# Patient Record
Sex: MALE | Race: WHITE | NOT HISPANIC OR LATINO | Employment: UNEMPLOYED | URBAN - METROPOLITAN AREA
[De-identification: names, ages, dates, MRNs, and addresses within clinical notes are randomized per-mention and may not be internally consistent; named-entity substitution may affect disease eponyms.]

---

## 2024-02-29 ENCOUNTER — OCCUPATIONAL HEALTH (OUTPATIENT)
Dept: URGENT CARE | Facility: CLINIC | Age: 29
End: 2024-02-29

## 2024-02-29 PROCEDURE — 80305 DRUG TEST PRSMV DIR OPT OBS: CPT | Mod: S$GLB,,, | Performed by: EMERGENCY MEDICINE

## 2024-07-22 DIAGNOSIS — S83.106A: Primary | ICD-10-CM

## 2024-07-26 ENCOUNTER — TELEPHONE (OUTPATIENT)
Dept: ORTHOPEDICS | Facility: CLINIC | Age: 29
End: 2024-07-26

## 2024-08-01 DIAGNOSIS — M25.561 RIGHT KNEE PAIN, UNSPECIFIED CHRONICITY: Primary | ICD-10-CM

## 2024-08-06 ENCOUNTER — OFFICE VISIT (OUTPATIENT)
Dept: ORTHOPEDICS | Facility: CLINIC | Age: 29
End: 2024-08-06
Payer: OTHER MISCELLANEOUS

## 2024-08-06 ENCOUNTER — HOSPITAL ENCOUNTER (OUTPATIENT)
Dept: RADIOLOGY | Facility: HOSPITAL | Age: 29
Discharge: HOME | End: 2024-08-06
Attending: ORTHOPAEDIC SURGERY
Payer: OTHER MISCELLANEOUS

## 2024-08-06 VITALS — HEIGHT: 72 IN | BODY MASS INDEX: 27.09 KG/M2 | WEIGHT: 200 LBS

## 2024-08-06 DIAGNOSIS — S83.411A COMPLETE TEAR OF MCL OF KNEE, RIGHT, INITIAL ENCOUNTER: ICD-10-CM

## 2024-08-06 DIAGNOSIS — S83.511A RUPTURE OF ANTERIOR CRUCIATE LIGAMENT OF RIGHT KNEE, INITIAL ENCOUNTER: Primary | ICD-10-CM

## 2024-08-06 DIAGNOSIS — M25.561 RIGHT KNEE PAIN, UNSPECIFIED CHRONICITY: ICD-10-CM

## 2024-08-06 PROCEDURE — 73564 X-RAY EXAM KNEE 4 OR MORE: CPT | Mod: 26,RT,, | Performed by: ORTHOPAEDIC SURGERY

## 2024-08-06 PROCEDURE — 99999 PR PBB SHADOW E&M-EST. PATIENT-LVL III: CPT | Mod: PBBFAC,,, | Performed by: ORTHOPAEDIC SURGERY

## 2024-08-06 PROCEDURE — 73564 X-RAY EXAM KNEE 4 OR MORE: CPT | Mod: TC,RT

## 2024-08-06 PROCEDURE — 99213 OFFICE O/P EST LOW 20 MIN: CPT | Mod: PBBFAC,25 | Performed by: ORTHOPAEDIC SURGERY

## 2024-08-06 PROCEDURE — 99204 OFFICE O/P NEW MOD 45 MIN: CPT | Mod: S$PBB,,, | Performed by: ORTHOPAEDIC SURGERY

## 2024-08-06 NOTE — H&P (VIEW-ONLY)
ASSESSMENT:      ICD-10-CM ICD-9-CM   1. Rupture of anterior cruciate ligament of right knee, initial encounter  S83.511A 844.2   2. Complete tear of MCL of knee, right, initial encounter  S83.411A 844.1       PLAN:     Findings and treatment options were discussed with the patient regarding the diagnosis.   All questions were answered regarding Imer Cagle 's painful knee.      Natural history and expected course discussed. Questions answered. Educational materials distributed. Rest, ice, compression, and elevation (RICE) therapy.  Will plan for ACL reconstruction    The spectrum of treatment options were discussed with the patient, including nonoperative and operative options.  We have discussed the surgery and recovery of arthroscopic knee surgery. he understands that there may be limited weightbearing up to several weeks after surgery depending on procedures that are performed at the time of surgery.    After thorough discussion, the patient has elected to undergo surgical treatment to include:  right   A. Knee arthroscopic Anterior Cruciate Ligament reconstruction with quad tendon autograft    B MCL repair versus reconstruction right knee with allograft    Informed consent for the procedure was obtained . The details of the procedure along with the expected postop rehab and recovery course were reviewed. Alternatives both operative and non-operative with associated risks and benefits discussed. The outlined risks and potential complications of the proposed procedure include but are not limited to: infection, poor wound healing, scarring, stiffness, swelling, continued or recurrent pain, instability, hardware or prosthetic failure, symptomatic hardware requiring removal, weakness, neurovascular injury, numbness, chronic regional pain disorder, tissue nonhealing/irreparability/retear, contralateral ligament injury, chondral injury, arthritis, fracture, blood clot formation, inability to return to  previous level of activity, anesthetic or regional block complication up to death, need for additional procedure as indicated, and potential need for further surgery.     he was also informed and understands the risks of surgery are greater for patients with a current condition or history of heart disease, obesity, clotting disorders, recurrent infections, steroid use, current or past smoking, and factors such as sedentary lifestyle and noncompliance with medications, therapy or follow-up. The degree of the increased risk is hard to estimate with any degree of precision.    All questions were answered. The patient has verbalized understanding of these issues and wishes to proceed as discussed.     There are no Patient Instructions on file for this visit.    IMAGING:   X-Ray Knee Complete 4 Or More Views Right    Result Date: 8/6/2024  See Procedure Notes for results. IMPRESSION: Please see Ortho procedure notes for report.  This procedure was auto-finalized by: Virtual Radiologist   Four views right knee were obtained today.  There is a bony avulsion from the medial proximal attachment of the MCL.  Mediolateral compartments appear to be well-preserved.      MRI was obtained from an outside center demonstrating the presence of a full-thickness ACL tear and a proximal MCL avulsion of the right knee      CC: Knee pain    29 y.o. Male who presents as a new patient to me for evaluation of  right knee pain.  States  he was changing a tire at work when it rolled on his knee.  This is a worker's comp case.  He describes this injury as a dislocation type event in his knee had to be popped back into place  Occupation:   Pain has been present for 3 weeks.  Injury description tire rolled on knee.   Mechanical symptoms, such as clicking, locking, and popping are present.    Swelling and effusions  are present.   The patient does  describe symptoms of knee instability, such as the knee buckling and the knee giving  way.   Symptoms are worsened with activity.  Better with rest. Treatment thus far has included rest, activity modifications, and oral medications.    he  has not had formal physical therapy.  he has not had prior injections injections into the knee.   he has had previous advanced imaging such as MRI.     Here today to discuss diagnosis and treatment options.          REVIEW OF SYSTEMS:   Constitution: Negative. Negative for chills, fever and night sweats.    Hematologic/Lymphatic: Negative for bleeding problem. Does not bruise/bleed easily.   Skin: Negative for dry skin, itching and rash.   Musculoskeletal: Negative for falls. Positive for knee pain and muscle weakness.     All other review of symptoms were reviewed and found to be noncontributory.     PAST MEDICAL HISTORY:   History reviewed. No pertinent past medical history.    PAST SURGICAL HISTORY:   Past Surgical History:   Procedure Laterality Date    HAND SURGERY         FAMILY HISTORY:   Family History   Problem Relation Name Age of Onset    Cancer Father      Diabetes Father      Hypertension Father      Heart disease Father         SOCIAL HISTORY:   Social History     Socioeconomic History    Marital status:    Tobacco Use    Smoking status: Every Day     Current packs/day: 1.00     Types: Cigarettes   Substance and Sexual Activity    Alcohol use: No    Drug use: No       MEDICATIONS:   No current outpatient medications on file.    ALLERGIES:   Review of patient's allergies indicates:  No Known Allergies     PHYSICAL EXAMINATION:    General    Nursing note and vitals reviewed.  HENT:   Nose: Nose normal.   Pulmonary/Chest: No respiratory distress. He exhibits no tenderness.   Abdominal: Soft. He exhibits no distension. There is no abdominal tenderness.   Psychiatric: Thought content normal.           Right Knee Exam     Tenderness   The patient is tender to palpation of the lateral joint line.    Range of Motion   Extension:  abnormal   Flexion:   abnormal     Tests   Meniscus   Travis:  Medial - positive   Ligament Examination   Lachman: abnormal   MCL - Valgus: normal (0 to 2mm)  Pivot Shift: abnormal    Muscle Strength   Right Lower Extremity   Quadriceps:  4/5   Hamstrin/5   Increased laxity to valgus stress.  Consistent with a grade 3 MCL rupture      No orders of the defined types were placed in this encounter.      Procedures

## 2024-08-07 DIAGNOSIS — S83.411A COMPLETE TEAR OF MCL OF KNEE, RIGHT, INITIAL ENCOUNTER: ICD-10-CM

## 2024-08-07 DIAGNOSIS — S83.511A RUPTURE OF ANTERIOR CRUCIATE LIGAMENT OF RIGHT KNEE: ICD-10-CM

## 2024-08-07 DIAGNOSIS — S83.511A RUPTURE OF ANTERIOR CRUCIATE LIGAMENT OF RIGHT KNEE, INITIAL ENCOUNTER: Primary | ICD-10-CM

## 2024-08-07 RX ORDER — MUPIROCIN 20 MG/G
OINTMENT TOPICAL
OUTPATIENT
Start: 2024-08-07

## 2024-08-07 RX ORDER — CEFAZOLIN SODIUM 2 G/50ML
2 SOLUTION INTRAVENOUS
OUTPATIENT
Start: 2024-08-07

## 2024-08-07 RX ORDER — SODIUM CHLORIDE 9 MG/ML
INJECTION, SOLUTION INTRAVENOUS CONTINUOUS
OUTPATIENT
Start: 2024-08-07

## 2024-08-08 RX ORDER — NAPROXEN 500 MG/1
500 TABLET ORAL 2 TIMES DAILY WITH MEALS
Qty: 60 TABLET | Refills: 3 | Status: SHIPPED | OUTPATIENT
Start: 2024-08-08

## 2024-08-09 ENCOUNTER — TELEPHONE (OUTPATIENT)
Dept: ORTHOPEDICS | Facility: CLINIC | Age: 29
End: 2024-08-09
Payer: OTHER MISCELLANEOUS

## 2024-08-14 ENCOUNTER — HOSPITAL ENCOUNTER (OUTPATIENT)
Facility: HOSPITAL | Age: 29
Discharge: HOME | End: 2024-08-14
Attending: ORTHOPAEDIC SURGERY
Payer: OTHER MISCELLANEOUS

## 2024-08-14 ENCOUNTER — ANESTHESIA (OUTPATIENT)
Dept: SURGERY | Facility: HOSPITAL | Age: 29
End: 2024-08-14
Payer: OTHER MISCELLANEOUS

## 2024-08-14 ENCOUNTER — ANESTHESIA EVENT (OUTPATIENT)
Dept: SURGERY | Facility: HOSPITAL | Age: 29
End: 2024-08-14
Payer: OTHER MISCELLANEOUS

## 2024-08-14 VITALS
WEIGHT: 200 LBS | BODY MASS INDEX: 27.09 KG/M2 | TEMPERATURE: 99 F | DIASTOLIC BLOOD PRESSURE: 87 MMHG | HEART RATE: 90 BPM | SYSTOLIC BLOOD PRESSURE: 122 MMHG | HEIGHT: 72 IN | OXYGEN SATURATION: 98 % | RESPIRATION RATE: 18 BRPM

## 2024-08-14 DIAGNOSIS — S83.511A RUPTURE OF ANTERIOR CRUCIATE LIGAMENT OF RIGHT KNEE, INITIAL ENCOUNTER: ICD-10-CM

## 2024-08-14 DIAGNOSIS — S83.511A RUPTURE OF ANTERIOR CRUCIATE LIGAMENT OF RIGHT KNEE: ICD-10-CM

## 2024-08-14 DIAGNOSIS — S83.411A COMPLETE TEAR OF MCL OF KNEE, RIGHT, INITIAL ENCOUNTER: Primary | ICD-10-CM

## 2024-08-14 PROCEDURE — 63600175 PHARM REV CODE 636 W HCPCS: Performed by: NURSE ANESTHETIST, CERTIFIED REGISTERED

## 2024-08-14 PROCEDURE — 25000003 PHARM REV CODE 250: Performed by: ORTHOPAEDIC SURGERY

## 2024-08-14 PROCEDURE — 27000655: Performed by: ANESTHESIOLOGY

## 2024-08-14 PROCEDURE — 37000009 HC ANESTHESIA EA ADD 15 MINS: Performed by: ORTHOPAEDIC SURGERY

## 2024-08-14 PROCEDURE — 63600175 PHARM REV CODE 636 W HCPCS: Performed by: ORTHOPAEDIC SURGERY

## 2024-08-14 PROCEDURE — 63600175 PHARM REV CODE 636 W HCPCS: Performed by: ANESTHESIOLOGY

## 2024-08-14 PROCEDURE — 71000016 HC POSTOP RECOV ADDL HR: Performed by: ORTHOPAEDIC SURGERY

## 2024-08-14 PROCEDURE — C1769 GUIDE WIRE: HCPCS | Performed by: ORTHOPAEDIC SURGERY

## 2024-08-14 PROCEDURE — 29888 ARTHRS AID ACL RPR/AGMNTJ: CPT | Mod: 59,RT,, | Performed by: ORTHOPAEDIC SURGERY

## 2024-08-14 PROCEDURE — 36000711: Performed by: ORTHOPAEDIC SURGERY

## 2024-08-14 PROCEDURE — 27200750 HC INSULATED NEEDLE/ STIMUPLEX: Performed by: ANESTHESIOLOGY

## 2024-08-14 PROCEDURE — 71000015 HC POSTOP RECOV 1ST HR: Performed by: ORTHOPAEDIC SURGERY

## 2024-08-14 PROCEDURE — 37000008 HC ANESTHESIA 1ST 15 MINUTES: Performed by: ORTHOPAEDIC SURGERY

## 2024-08-14 PROCEDURE — 25000003 PHARM REV CODE 250: Performed by: NURSE ANESTHETIST, CERTIFIED REGISTERED

## 2024-08-14 PROCEDURE — C1762 CONN TISS, HUMAN(INC FASCIA): HCPCS | Performed by: ORTHOPAEDIC SURGERY

## 2024-08-14 PROCEDURE — 36000710: Performed by: ORTHOPAEDIC SURGERY

## 2024-08-14 PROCEDURE — 27000284 HC CANNULA NASAL: Performed by: ANESTHESIOLOGY

## 2024-08-14 PROCEDURE — 27000177 HC AIRWAY, LARYNGEAL MASK: Performed by: ANESTHESIOLOGY

## 2024-08-14 PROCEDURE — C1713 ANCHOR/SCREW BN/BN,TIS/BN: HCPCS | Performed by: ORTHOPAEDIC SURGERY

## 2024-08-14 PROCEDURE — 27201423 OPTIME MED/SURG SUP & DEVICES STERILE SUPPLY: Performed by: ORTHOPAEDIC SURGERY

## 2024-08-14 PROCEDURE — 27000716 HC OXISENSOR PROBE, ANY SIZE: Performed by: ANESTHESIOLOGY

## 2024-08-14 PROCEDURE — 71000033 HC RECOVERY, INTIAL HOUR: Performed by: ORTHOPAEDIC SURGERY

## 2024-08-14 PROCEDURE — 27427 RECONSTRUCTION KNEE: CPT | Mod: 51,RT,, | Performed by: ORTHOPAEDIC SURGERY

## 2024-08-14 PROCEDURE — 97161 PT EVAL LOW COMPLEX 20 MIN: CPT

## 2024-08-14 PROCEDURE — 27405 REPAIR OF KNEE LIGAMENT: CPT | Mod: 51,RT,, | Performed by: ORTHOPAEDIC SURGERY

## 2024-08-14 PROCEDURE — 27000510 HC BLANKET BAIR HUGGER ANY SIZE: Performed by: ANESTHESIOLOGY

## 2024-08-14 DEVICE — SET,FOR GRAFT PASS WIRE
Type: IMPLANTABLE DEVICE | Site: KNEE | Status: FUNCTIONAL
Brand: ARTHREX®

## 2024-08-14 DEVICE — LNT IMPLANT SYSTEM, 3.9 BC SWIVELOCK
Type: IMPLANTABLE DEVICE | Site: KNEE | Status: FUNCTIONAL
Brand: ARTHREX®

## 2024-08-14 DEVICE — BIO-COMP SWVLK C, CLD 4.75X19.1MM
Type: IMPLANTABLE DEVICE | Site: KNEE | Status: FUNCTIONAL
Brand: ARTHREX®

## 2024-08-14 DEVICE — IMPLANTABLE DEVICE
Type: IMPLANTABLE DEVICE | Site: KNEE | Status: FUNCTIONAL
Brand: JRF VERSAGRAFT

## 2024-08-14 DEVICE — 3.0MM KNOTLESS SUTURETAK
Type: IMPLANTABLE DEVICE | Site: KNEE | Status: FUNCTIONAL
Brand: ARTHREX®

## 2024-08-14 DEVICE — IMPL SYS, AUTOGRAFT GRAFTLINK CP 2
Type: IMPLANTABLE DEVICE | Site: KNEE | Status: FUNCTIONAL
Brand: ARTHREX®

## 2024-08-14 DEVICE — FIBERTAG TIGHTROPE II ABS
Type: IMPLANTABLE DEVICE | Site: KNEE | Status: FUNCTIONAL
Brand: ARTHREX®

## 2024-08-14 DEVICE — IMPLSYS 2NDRY FIXATN BIOSWVLK 4.75X19.1
Type: IMPLANTABLE DEVICE | Site: KNEE | Status: FUNCTIONAL
Brand: ARTHREX®

## 2024-08-14 RX ORDER — KETAMINE HYDROCHLORIDE 10 MG/ML
INJECTION, SOLUTION INTRAMUSCULAR; INTRAVENOUS
Status: DISCONTINUED | OUTPATIENT
Start: 2024-08-14 | End: 2024-08-14

## 2024-08-14 RX ORDER — OXYCODONE AND ACETAMINOPHEN 5; 325 MG/1; MG/1
1 TABLET ORAL EVERY 4 HOURS PRN
Status: DISCONTINUED | OUTPATIENT
Start: 2024-08-14 | End: 2024-08-14 | Stop reason: HOSPADM

## 2024-08-14 RX ORDER — FENTANYL CITRATE 50 UG/ML
INJECTION, SOLUTION INTRAMUSCULAR; INTRAVENOUS
Status: DISCONTINUED | OUTPATIENT
Start: 2024-08-14 | End: 2024-08-14

## 2024-08-14 RX ORDER — ONDANSETRON 4 MG/1
8 TABLET, ORALLY DISINTEGRATING ORAL EVERY 8 HOURS PRN
Status: DISCONTINUED | OUTPATIENT
Start: 2024-08-14 | End: 2024-08-14 | Stop reason: HOSPADM

## 2024-08-14 RX ORDER — OXYCODONE HYDROCHLORIDE 5 MG/1
10 TABLET ORAL EVERY 4 HOURS PRN
Status: DISCONTINUED | OUTPATIENT
Start: 2024-08-14 | End: 2024-08-14 | Stop reason: HOSPADM

## 2024-08-14 RX ORDER — OXYCODONE AND ACETAMINOPHEN 10; 325 MG/1; MG/1
1 TABLET ORAL EVERY 6 HOURS PRN
Qty: 30 TABLET | Refills: 0 | Status: SHIPPED | OUTPATIENT
Start: 2024-08-14

## 2024-08-14 RX ORDER — ACETAMINOPHEN 10 MG/ML
INJECTION, SOLUTION INTRAVENOUS
Status: DISCONTINUED | OUTPATIENT
Start: 2024-08-14 | End: 2024-08-14

## 2024-08-14 RX ORDER — MIDAZOLAM HYDROCHLORIDE 1 MG/ML
INJECTION INTRAMUSCULAR; INTRAVENOUS
Status: DISCONTINUED | OUTPATIENT
Start: 2024-08-14 | End: 2024-08-14

## 2024-08-14 RX ORDER — SULFAMETHOXAZOLE AND TRIMETHOPRIM 800; 160 MG/1; MG/1
1 TABLET ORAL 2 TIMES DAILY
Qty: 6 TABLET | Refills: 0 | Status: SHIPPED | OUTPATIENT
Start: 2024-08-14 | End: 2024-08-17

## 2024-08-14 RX ORDER — DIPHENHYDRAMINE HYDROCHLORIDE 50 MG/ML
25 INJECTION INTRAMUSCULAR; INTRAVENOUS EVERY 6 HOURS PRN
Status: DISCONTINUED | OUTPATIENT
Start: 2024-08-14 | End: 2024-08-14 | Stop reason: HOSPADM

## 2024-08-14 RX ORDER — PHENYLEPHRINE HYDROCHLORIDE 10 MG/ML
INJECTION INTRAVENOUS
Status: DISCONTINUED | OUTPATIENT
Start: 2024-08-14 | End: 2024-08-14

## 2024-08-14 RX ORDER — ASPIRIN 81 MG/1
81 TABLET ORAL DAILY
Qty: 30 TABLET | Refills: 0 | Status: SHIPPED | OUTPATIENT
Start: 2024-08-14 | End: 2025-08-14

## 2024-08-14 RX ORDER — MEPERIDINE HYDROCHLORIDE 25 MG/ML
25 INJECTION INTRAMUSCULAR; INTRAVENOUS; SUBCUTANEOUS EVERY 10 MIN PRN
Status: DISCONTINUED | OUTPATIENT
Start: 2024-08-14 | End: 2024-08-14 | Stop reason: HOSPADM

## 2024-08-14 RX ORDER — ROPIVACAINE HYDROCHLORIDE 7.5 MG/ML
INJECTION, SOLUTION EPIDURAL; PERINEURAL
Status: COMPLETED | OUTPATIENT
Start: 2024-08-14 | End: 2024-08-14

## 2024-08-14 RX ORDER — LIDOCAINE HYDROCHLORIDE 20 MG/ML
INJECTION, SOLUTION EPIDURAL; INFILTRATION; INTRACAUDAL; PERINEURAL
Status: DISCONTINUED | OUTPATIENT
Start: 2024-08-14 | End: 2024-08-14

## 2024-08-14 RX ORDER — ONDANSETRON 4 MG/1
4 TABLET, ORALLY DISINTEGRATING ORAL EVERY 6 HOURS PRN
Qty: 30 TABLET | Refills: 0 | Status: SHIPPED | OUTPATIENT
Start: 2024-08-14

## 2024-08-14 RX ORDER — HYDROMORPHONE HYDROCHLORIDE 2 MG/ML
INJECTION, SOLUTION INTRAMUSCULAR; INTRAVENOUS; SUBCUTANEOUS
Status: DISCONTINUED | OUTPATIENT
Start: 2024-08-14 | End: 2024-08-14

## 2024-08-14 RX ORDER — GLUCAGON 1 MG
1 KIT INJECTION
Status: DISCONTINUED | OUTPATIENT
Start: 2024-08-14 | End: 2024-08-14 | Stop reason: HOSPADM

## 2024-08-14 RX ORDER — HYDROMORPHONE HYDROCHLORIDE 2 MG/ML
0.5 INJECTION, SOLUTION INTRAMUSCULAR; INTRAVENOUS; SUBCUTANEOUS EVERY 5 MIN PRN
Status: DISCONTINUED | OUTPATIENT
Start: 2024-08-14 | End: 2024-08-14 | Stop reason: HOSPADM

## 2024-08-14 RX ORDER — ONDANSETRON HYDROCHLORIDE 2 MG/ML
INJECTION, SOLUTION INTRAVENOUS
Status: DISCONTINUED | OUTPATIENT
Start: 2024-08-14 | End: 2024-08-14

## 2024-08-14 RX ORDER — SODIUM CHLORIDE 9 MG/ML
INJECTION, SOLUTION INTRAVENOUS CONTINUOUS
Status: DISCONTINUED | OUTPATIENT
Start: 2024-08-14 | End: 2024-08-14 | Stop reason: HOSPADM

## 2024-08-14 RX ORDER — CELECOXIB 200 MG/1
200 CAPSULE ORAL 2 TIMES DAILY
Qty: 60 CAPSULE | Refills: 2 | Status: SHIPPED | OUTPATIENT
Start: 2024-08-14

## 2024-08-14 RX ORDER — DEXAMETHASONE SODIUM PHOSPHATE 4 MG/ML
INJECTION, SOLUTION INTRA-ARTICULAR; INTRALESIONAL; INTRAMUSCULAR; INTRAVENOUS; SOFT TISSUE
Status: DISCONTINUED | OUTPATIENT
Start: 2024-08-14 | End: 2024-08-14

## 2024-08-14 RX ORDER — MORPHINE SULFATE 10 MG/ML
4 INJECTION INTRAMUSCULAR; INTRAVENOUS; SUBCUTANEOUS EVERY 5 MIN PRN
Status: DISCONTINUED | OUTPATIENT
Start: 2024-08-14 | End: 2024-08-14 | Stop reason: HOSPADM

## 2024-08-14 RX ORDER — GLYCOPYRROLATE 0.2 MG/ML
INJECTION INTRAMUSCULAR; INTRAVENOUS
Status: DISCONTINUED | OUTPATIENT
Start: 2024-08-14 | End: 2024-08-14

## 2024-08-14 RX ORDER — MUPIROCIN 20 MG/G
OINTMENT TOPICAL 2 TIMES DAILY
Status: DISCONTINUED | OUTPATIENT
Start: 2024-08-14 | End: 2024-08-14 | Stop reason: HOSPADM

## 2024-08-14 RX ORDER — MUPIROCIN 20 MG/G
OINTMENT TOPICAL
Status: DISCONTINUED | OUTPATIENT
Start: 2024-08-14 | End: 2024-08-14 | Stop reason: HOSPADM

## 2024-08-14 RX ORDER — PROPOFOL 10 MG/ML
INJECTION, EMULSION INTRAVENOUS
Status: DISCONTINUED | OUTPATIENT
Start: 2024-08-14 | End: 2024-08-14

## 2024-08-14 RX ORDER — ACETAMINOPHEN 500 MG
1000 TABLET ORAL ONCE
Status: DISCONTINUED | OUTPATIENT
Start: 2024-08-14 | End: 2024-08-14 | Stop reason: HOSPADM

## 2024-08-14 RX ORDER — ONDANSETRON HYDROCHLORIDE 2 MG/ML
4 INJECTION, SOLUTION INTRAVENOUS DAILY PRN
Status: DISCONTINUED | OUTPATIENT
Start: 2024-08-14 | End: 2024-08-14 | Stop reason: HOSPADM

## 2024-08-14 RX ORDER — TRANEXAMIC ACID 100 MG/ML
INJECTION, SOLUTION INTRAVENOUS
Status: DISCONTINUED | OUTPATIENT
Start: 2024-08-14 | End: 2024-08-14

## 2024-08-14 RX ADMIN — PHENYLEPHRINE HYDROCHLORIDE 150 MCG: 10 INJECTION INTRAVENOUS at 12:08

## 2024-08-14 RX ADMIN — ACETAMINOPHEN 1000 MG: 10 INJECTION, SOLUTION INTRAVENOUS at 11:08

## 2024-08-14 RX ADMIN — ONDANSETRON 8 MG: 4 TABLET, ORALLY DISINTEGRATING ORAL at 05:08

## 2024-08-14 RX ADMIN — FENTANYL CITRATE 25 MCG: 50 INJECTION, SOLUTION INTRAMUSCULAR; INTRAVENOUS at 11:08

## 2024-08-14 RX ADMIN — DEXAMETHASONE SODIUM PHOSPHATE 8 MG: 4 INJECTION, SOLUTION INTRA-ARTICULAR; INTRALESIONAL; INTRAMUSCULAR; INTRAVENOUS; SOFT TISSUE at 11:08

## 2024-08-14 RX ADMIN — PHENYLEPHRINE HYDROCHLORIDE 150 MCG: 10 INJECTION INTRAVENOUS at 01:08

## 2024-08-14 RX ADMIN — ROPIVACAINE HYDROCHLORIDE 40 ML: 7.5 INJECTION, SOLUTION EPIDURAL; PERINEURAL at 11:08

## 2024-08-14 RX ADMIN — HYDROMORPHONE HYDROCHLORIDE 0.5 MG: 2 INJECTION, SOLUTION INTRAMUSCULAR; INTRAVENOUS; SUBCUTANEOUS at 01:08

## 2024-08-14 RX ADMIN — CEFAZOLIN 2 G: 2 INJECTION, POWDER, FOR SOLUTION INTRAMUSCULAR; INTRAVENOUS at 11:08

## 2024-08-14 RX ADMIN — HYDROMORPHONE HYDROCHLORIDE 0.5 MG: 2 INJECTION, SOLUTION INTRAMUSCULAR; INTRAVENOUS; SUBCUTANEOUS at 04:08

## 2024-08-14 RX ADMIN — KETAMINE HYDROCHLORIDE 25 MG: 10 INJECTION INTRAMUSCULAR; INTRAVENOUS at 11:08

## 2024-08-14 RX ADMIN — ONDANSETRON 4 MG: 2 INJECTION INTRAMUSCULAR; INTRAVENOUS at 04:08

## 2024-08-14 RX ADMIN — PHENYLEPHRINE HYDROCHLORIDE 100 MCG: 10 INJECTION INTRAVENOUS at 03:08

## 2024-08-14 RX ADMIN — GLYCOPYRROLATE 0.1 MG: 0.2 INJECTION INTRAMUSCULAR; INTRAVENOUS at 11:08

## 2024-08-14 RX ADMIN — CEFAZOLIN 2 G: 2 INJECTION, POWDER, FOR SOLUTION INTRAMUSCULAR; INTRAVENOUS at 04:08

## 2024-08-14 RX ADMIN — KETAMINE HYDROCHLORIDE 25 MG: 10 INJECTION INTRAMUSCULAR; INTRAVENOUS at 12:08

## 2024-08-14 RX ADMIN — ONDANSETRON 4 MG: 2 INJECTION INTRAMUSCULAR; INTRAVENOUS at 11:08

## 2024-08-14 RX ADMIN — MIDAZOLAM HYDROCHLORIDE 2 MG: 1 INJECTION, SOLUTION INTRAMUSCULAR; INTRAVENOUS at 11:08

## 2024-08-14 RX ADMIN — TRANEXAMIC ACID 1000 MG: 100 INJECTION, SOLUTION INTRAVENOUS at 03:08

## 2024-08-14 RX ADMIN — OXYCODONE 10 MG: 5 TABLET ORAL at 05:08

## 2024-08-14 RX ADMIN — PROPOFOL 200 MG: 10 INJECTION, EMULSION INTRAVENOUS at 11:08

## 2024-08-14 RX ADMIN — PHENYLEPHRINE HYDROCHLORIDE 100 MCG: 10 INJECTION INTRAVENOUS at 12:08

## 2024-08-14 RX ADMIN — LIDOCAINE HYDROCHLORIDE 60 MG: 20 INJECTION, SOLUTION INTRAVENOUS at 11:08

## 2024-08-14 RX ADMIN — SODIUM CHLORIDE, POTASSIUM CHLORIDE, SODIUM LACTATE AND CALCIUM CHLORIDE: 600; 310; 30; 20 INJECTION, SOLUTION INTRAVENOUS at 12:08

## 2024-08-14 RX ADMIN — SODIUM CHLORIDE: 9 INJECTION, SOLUTION INTRAVENOUS at 09:08

## 2024-08-14 RX ADMIN — FENTANYL CITRATE 50 MCG: 50 INJECTION, SOLUTION INTRAMUSCULAR; INTRAVENOUS at 01:08

## 2024-08-14 NOTE — PT/OT/SLP EVAL
Physical Therapy Evaluation    Patient Name:  Imer Cagle   MRN:  [id number]    Recommendations:     Discharge Recommendations: Low Intensity Therapy   Discharge Equipment Recommendations: crutches   Barriers to discharge: None    Assessment:     Imer Cagle is a 29 y.o. male admitted with a medical diagnosis of Complete tear of MCL of knee, right, initial encounter.  He presents with the following impairments/functional limitations: decreased ROM, orthopedic precautions, gait instability Patient with good understanding of post op education. .    Rehab Prognosis: Good; patient would benefit from acute skilled PT services to address these deficits and reach maximum level of function.    Recent Surgery: Procedure(s) (LRB):  RECONSTRUCTION, KNEE, ACL, ARTHROSCOPIC (Right)  RECONSTRUCTION, LIGAMENT (Right)  ARTHROSCOPY, KNEE (Right) Day of Surgery    Plan:     During this hospitalization, patient to be seen 1 x/week to address the identified rehab impairments via gait training, therapeutic activities and progress toward the following goals:    Plan of Care Expires:  08/14/24    Subjective     Chief Complaint: post op pain  Patient/Family Comments/goals: plans on dc home  Pain/Comfort:  Pain Rating 1: 8/10  Location - Side 1: Right  Location 1: knee  Pain Addressed 1: Cessation of Activity, Pre-medicate for activity    Patients cultural, spiritual, Confucianist conflicts given the current situation:      Living Environment:  Lives with family  Prior to admission, patients level of function was independent.  Equipment used at home: none.  DME owned (not currently used): .  Upon discharge, patient will have assistance from family.    Objective:     Communicated with nurse prior to session.  Patient found supine with peripheral IV  upon PT entry to room.    General Precautions: Standard, fall  Orthopedic Precautions:RLE partial weight bearing   Braces: Hinged knee brace  Respiratory Status: Room  air    Exams:  na    Functional Mobility:  Gait: ambulated 20 feet with crutches nwb right le      AM-PAC 6 CLICK MOBILITY  Total Score:24       Treatment & Education:  PWB limitation  Hep: acl/mcl protocol    Patient left supine with all lines intact.    GOALS:   Multidisciplinary Problems       Physical Therapy Goals       Not on file                    History:     No past medical history on file.    Past Surgical History:   Procedure Laterality Date    HAND SURGERY         Time Tracking:     PT Received On: 08/14/24  PT Start Time: 1800     PT Stop Time: 1820  PT Total Time (min): 20 min     Billable Minutes: Evaluation 20 08/14/2024

## 2024-08-14 NOTE — TRANSFER OF CARE
Anesthesia Transfer of Care Note    Patient: Imer Cagle    Procedure(s) Performed: Procedure(s) (LRB):  RECONSTRUCTION, KNEE, ACL, ARTHROSCOPIC (Right)  RECONSTRUCTION, LIGAMENT (Right)  ARTHROSCOPY, KNEE (Right)    Patient location: PACU    Anesthesia Type: general    Transport from OR: Transported from OR on 2-3 L/min O2 by NC with adequate spontaneous ventilation    Post pain: adequate analgesia    Post assessment: no apparent anesthetic complications and tolerated procedure well    Post vital signs: stable    Level of consciousness: alert and responds to stimulation    Nausea/Vomiting: no nausea/vomiting    Complications: none    Transfer of care protocol was followed      Last vitals: Visit Vitals  /79   Pulse 99   Temp 37.2 °C (99 °F) (Oral)   Resp 19   Ht 6' (1.829 m)   Wt 90.7 kg (200 lb)   SpO2 97%   BMI 27.12 kg/m²

## 2024-08-14 NOTE — ANESTHESIA PREPROCEDURE EVALUATION
08/14/2024  Imer Cagle is a 29 y.o., male.      Pre-op Assessment    I have reviewed the Patient Summary Reports.    I have reviewed the NPO Status.   I have reviewed the Medications.     Review of Systems         Anesthesia Plan  Type of Anesthesia, risks & benefits discussed:    Anesthesia Type: Gen Supraglottic Airway, Regional  Intra-op Monitoring Plan: Standard ASA Monitors  Post Op Pain Control Plan: multimodal analgesia  Induction:  IV  Informed Consent: Informed consent signed with the Patient and all parties understand the risks and agree with anesthesia plan.  All questions answered.   ASA Score: 1    Ready For Surgery From Anesthesia Perspective.     .  NPO greater than 8 hours  No anesthetic complications  NKDA    Denies other medical conditions    MP II; adequate ROM at neck

## 2024-08-14 NOTE — ANESTHESIA PROCEDURE NOTES
Intubation    Date/Time: 8/14/2024 11:49 AM    Performed by: Robert Allen Jr., CRNA  Authorized by: Jairon Chapa MD    Intubation:     Induction:  Intravenous    Intubated:  Postinduction    Mask Ventilation:  Easy mask    Attempted By:  CRNA    Method of Intubation:  Direct    Difficult Airway Encountered?: No      Complications:  None    Airway Device:  Supraglottic airway/LMA (Igel)    Airway Device Size:  5.0    Style/Cuff Inflation:  Cuffed    Secured at:  The lips    Placement Verified By:  Capnometry    Complicating Factors:  None    Findings Post-Intubation:  Atraumatic/condition of teeth unchanged

## 2024-08-14 NOTE — OR NURSING
1650 REC'D TO PACU IN STABLE CONDITION. VSS. SATS 97% ON RA. DENIES PAIN AT THIS TIME. DSG TO R KNEE C/D/I w/ IMMOBILIZER NOTED TO R LEG. WILL CONT TO MONITOR.    1710 PT C/O BURNING PAIN IN R KNEE. OFFERED PAIN MED (MORPHINE) PT REFUSED. RR 10 SATS 95% ON RA. ICE PACK APPLIED TO R KNEE FOR COMFORT.    1730 TRANSFERRED TO [facility] AWAKE IN STABLE CONDITION. REPORT GIVEN TO REYNALDO PEREZ RN. /99 HR 91 RR 8 SATS 97% ON RA. WIFE AT BEDSIDE.

## 2024-08-14 NOTE — OP NOTE
Surgery Date: 8/14/2024      Surgeon(s) and Role:     * Anoop Arnold MD - Primary    Assistant: Jacob Vidales    Pre-op Diagnosis:   Rupture of anterior cruciate ligament of right knee, initial encounter [S83.511A]  Complete tear of MCL of knee, right, initial encounter [S83.411A]     Post-op Diagnosis:  Post-Op Diagnosis Codes:     * Rupture of anterior cruciate ligament of right knee, initial encounter [S83.511A]     * Complete tear of MCL of knee, right, initial encounter [S83.411A]     Procedure:    1. Right knee arthroscopic assisted anterior cruciate ligament reconstruction with semitendinosis allograft  2. Right knee medial collateral ligament reconstruction utilizing allograft  3. Right knee open repair of posterior oblique ligament   4. Right knee open repair of medial patellofemoral ligament    Anesthesia:  General + adductor canal block    EBL: 5cc    Implants:   Implant Name Type Inv. Item Serial No.  Lot No. LRB No. Used Action   SPEEDGRAFT TENDON      Right 1 Implanted   AR[employer]X FIBERTAG TIGHTROPE II IMPLANT FOR THE INTERNALBRACE TECHNIQUE     [id number] Right 2 Implanted   DEVICE FIX TIGHTROPE FIBERTAG - [id number]  DEVICE FIX TIGHTROPE FIBERTAG  [employer] [id number] Right 1 Implanted   WIRE SET PASSING GRAFT - [id number]  WIRE SET PASSING GRAFT  [employer]X [id number] Right 1 Implanted   IMPLANT SYSTEM, TIGHTROPE II RT, RECON WITH FIBERTAPE IB, FLIPCUTTER III, AND FIBERSTICK     [id number] Right 1 Implanted   SYS AUTOGRAFT GRAFTLINK CP - [id number]  SYS AUTOGRAFT GRAFTLINK CP  [employer]X [id number] Right 1 Implanted   GRAFT VERSAGRAFT 4-5X150-250MM - [id number]  GRAFT VERSAGRAFT 4-5X150-250MM [id number] [employer]  Right 1 Implanted   WIRE SET PASSING GRAFT - [id number]  WIRE SET PASSING GRAFT  AR[employer]REX [id number] Right 1 Implanted   SYS IMP SWVLOK 3.9 BC - [id number]  SYS IMP SWVLOK 3.9 BC  AR[employer]X [id number] Right 1 Implanted   SYS SWIVELOCK ANCH 4.75X19.1MM -  [id number]  SYS SWIVELOCK ANCH 4.75X19.1MM  ARTHREX [id number] Right 1 Implanted   ANCHOR BIOCOMP SWVLLOK - [id number]  ANCHOR BIOCOMP SWVLLOK  ARTH[employer]X [id number] Right 1 Implanted   ANCHOR SUT KNOTLESS 3MM - [id number]  ANCHOR SUT KNOTLESS 3MM  AR[employer]X [id number] Right 1 Implanted       Tourniquet time: 120 mins    Complication:   none      DISPOSITION: Extubated and taken to the recovery room in stable condition with less than 2 second capillary refills in all digits and compartments were soft.     INDICATIONS:  The patient is a 29 y.o. year-old who had a history and physical examination consistent with the aforementioned diagnoses.  The risks and benefits were discussed with the patient.  The patient acknowledged an understanding and wished to proceed with operative intervention.  Informed consent was obtained prior to the procedure.  Details of the surgical procedure were explained including incisions, probable rehabilitation course and description of the hardware and graft to be used was given.  The patient understands the likely length of convalescence after surgery and we reasonably explained the risks, benefits and alternatives to surgery.  The reasonable expectations and potential complications were discussed and acknowledged including, but not limited to, infection, bleeding, blood clots, nerve injury, retear, instability and continued pain and stiffness.  It was also explained that there was a chance of failure, which may require further management.  The patient agreed, understood and wished to proceed.      EXAMINATION UNDER ANESTHESIA of the OPERATIVE KNEE:    Range of motion from to 0 to 140 degrees, a grade 2 Lachman's test and grade 2 pivot shift test.  The patient was stable to varus and valgus stress at 0 and 30 degrees of flexion.  There was a negative effusion.      EXAMINATION UNDER ANESTHESIA of the NONOPERATIVE KNEE:    Range of motion from 0 to 140 degrees, stable to varus and valgus stress at 0  and 30 degrees of flexion, a negative Lachman's test, negative pivot shift and a negative effusion.       DESCRIPTION OF OPERATION: After the correct operative site was marked by the operating surgeon and the correct consents were signed, the patient was brought to the Operating Room and placed in the supine position where general anesthetic was administered by the Anesthesia Team.  All pressure points were carefully padded and checked.  The operative lower extremity had a well padded tourniquet. The operative leg was prepped and draped free in the usual sterile fashion.  After prepping and draping, the appropriate landmarks were noted on the skin. A surgical pause was performed and the patient received prophylactic antibiotics. The knee was exsanguinated and the tourniquet was inflated to 250 mmHg.     The inferolateral followed by the inferomedial portals were created and systemic examination of the joint revealed the following:     PF  negative compartmentalization or adhesions in the suprapatellar pouch.   Trochlear chondromalacia:none  Patella chondromalacia: none    Medial  Medial femoral chondyle chondromalacia : none  Medial tibial plateau chondromalacia none  Negative for meniscus tear.    Lateral  Lateral femoral condyle chondromalacia: none  Lateral tibial plateau chondromalacia: none  Negative for meniscus tear.      Examination of the intercondylar notch revealed a complete tear of the ACL. The PCL was probed and found to be contused and partially torn represented a grade 1 sprain.  Positive drive-through sign was seen on the medial aspect of the knee consistent with a high-grade MCL injury    We elected to use allograft in this case as the patient required a medial collateral ligament reconstruction.  I utilized a posterior tibialis graft and cut this to 16 cm in length and prepared this with a fiber tag on the back table this was connected to tight ropes on either side.  I made a dissection towards  the medial distal femur utilizing a 3 cm incision.  The incision was carried through skin and subcutaneous tissue.  After incising the sartorial fascia I found the medial collateral ligament tear.  The attachment of the medial collateral ligament was completely torn in the subacute nature of this injury suggested that reconstruction was necessary given the significant tearing that was present.  The hematoma in the area of the tear was evacuated.  I was able to tag a sleeve of tissue which likely had some substance of the medial collateral ligament but also contained a large portion of the posterior oblique ligament posteriorly and the medial patellofemoral ligament anteriorly and proximally.  I was able to tagged this with a 2. FiberWire and creating a Krackow suture configuration and saved this for later reapproximation.  At this time identified the isometric point of the medial collateral ligament origin on the femur and used a Beath pin to create a  hole under fluoroscopic imaging.  I then created an incision 2 cm medial to the tibial tubercle extending approximately 4 cm in length incision was carried through skin and subcutaneous tissue and an L-shaped incision in the sartorial fascia was then created hamstrings were retracted and the footprint of the MCL was identified.  I placed a  hole with a Beath pin through the center of the MCL and drilled this from medial to lateral and then over reamed this to 8 mm corresponding with the thickness of the prepared medial collateral ligament graft.  The graft was then passed through the tibial tunnel after only reaming this and the button was flipped on the lateral tibial cortex.  I additionally reamed proximal femoral tunnel after confirming isometry to an adequate depth pass the graft and flipped the button but did not tensioned the graft at this time.  Also placed a knotless suture tack 3 mm in size proximally 12 mm distal to the joint line in line with the  graft left this alone tensioned at this point.  Should be noticed that the position of the femoral tunnel was about 2.5 cm proximal to the joint line and the MCL attached was 6 cm distal.  I then prepared the ACL graft and a standard graft link fashion utilizing a semitendinosis allograft turned my attention back towards arthroscopic assisted ACL reconstruction                                                                The anatomic footprint of the ACL was noted both on the lateral femoral condyle as well as the tibial plateau.  After marking the center of both, we turned our attention to drilling a femoral tunnel.  The Ar[employer]rex FlipCutter guide was introduced through the inferolateral portal and centered in the anatomic footprint of the ACL.  We then made a 1 cm incision over the lateral aspect of the thigh and the FlipCutter guide was introduced down to bone.  We then introduced the FlipCutter through the guide.  We drilled the FlipCutter guide to the center of the ACL footprint.  We then flipped it into a reamed position and reamed a tunnel.    Our attention was then turned to the tibial tunnel.  After centering the guide in center of the anatomic footprint, the FlipCutter was introduced into the previously made medial tibia incision.  The guide pin was drilled through the center of the tibial footprint and flipped into a reamed position.  The tibial tunnel was then reamed.     After reaming the tibia, the graft was passed in the standard fashion.  We ensured the button flipped over the lateral cortex of the femur. The graft was drawn into the femoral tunnel stopping 2 mm from the end of the femoral tunnel. We then brought the graft into the tibial tunnel. There was adequate tibial tunnel length and the graft did not bottom out. The knee was brought into full extension. There was no evidence of impingement.     We then secured the graft on the tibial side with a ABS button. We then secured the internal brace,  which was secured through the tight rope button over the lateral cortex of the femur. We did this in full extension with a 4.75mm swivel lock distal to the tibial tunnel. The tibial portion of the graft was then tensioned prior to cycling the knee 40 times. The leg was brought into full extension. With a posterior drawer force in place, the graft was re-tensioned on the tibial side and then the femoral side.  The arthroscope was introduced into the knee.  The graft was probed and found to be taut.     The sutures from the graftlink and the tibial tightrope sutures were secured in the tibia with a 4.75mm swivel lock anchor. The femoral tightrope sutures were tied down to the button.     After the completion of this, the patient had a negative Lachman's test and a negative pivot shift test.  The core sutures strand in the Swivelock was used to repair the L shaped incision in the sartorial fascia.    I then turned my attention towards repairing the remnant medial collateral ligament posterior oblique ligament and medial patellofemoral ligament.  I was able to place a 3.9 mm SwiveLock anchor just proximal to the tunnel used for allograft in into a anatomic location and in this area.  This was tensioned appropriately with the leg held in 30° flexion and varus.  I then sequentially tensioned the limbs of the graft in the tibia and femur and also tensioned the deep MCL attachment on the proximal tibia as well with the knotless mechanism.  Excellent fixation of the graft was able to be achieved at this point and stability was once again assessed under fluoroscopic imaging confirming excellent restoration of the medial stabilizers of the knee      The wounds were copiously irrigated and closed in layers with #2-0 vicryl and #3-0 monocryl.  Portals were closed with 3'0 nylon.  A sterile dressing and hinged knee brace were placed.  The patient was allowed to recover from anesthesia, extubated and taken to the Recovery Room in  stable condition with less than 1 second capillary refill in all digits and soft compartments.

## 2024-08-14 NOTE — PLAN OF CARE
The reconstruction and MCL reconstruction    EARLY PHASE (Weeks 0-4)   ? Weight Bearing and Range of Motion   o 0-6 weeks: toe-touch weight bearing w/ crutches   o ROM: A/AAROM 0-90 as tolerated    Brace Use: o Locked in full extension at all times other than PT   Therapeutic Elements:   o Modalities as needed   o Patella Mob; SLRs with electric stim.; co-contractions, prone hangs   o Estim; Cocontractions   o No abduction of hip or leg at any time.   ? Goals: o a/aa/ROM: 0-0-90   o Control pain/swelling   o Quad control       RECOVERY PHASE (Weeks 5-8) ? Weight Bearing and Range of Motion: o Discontinue crutches at week 6 ? Brace Use: o At all times, open to AROM; discontinue at week 8 ? Therapeutic Elements: o Continue above o Gentle hip abduction with no resistance below knee o Wall-sits 0-45 o Mini-squats with support 0-45 o Carpet drags (not with PCL reconstruction!!) o Pool therapy o Treadmill walking by 8 weeks ? Goals: o a/aa/ROM: 0-0-110 by 6 weeks and free by 8 weeks o SLR x 30 o No effusion      STRENGTHEN PHASE (Weeks 8-12) ? Weight Bearing and Range of Motion: o Full ? Therapeutic Elements: o Continue above with increased resistance o Step-downs o Treadmill o Stretching  o Begin prone hangs and HSL (if PCL reconstruction) ? Goals: o Walk 1-2 miles at 15 min/mile pace       REINTEGRATION PHASE (Months 3-5) ? Weight Bearing and Range of Motion: o Full ? Brace Use: o None o If return to sport, fitting for custom brace by 5 months o Can start jogging/running at 6 months ? Therapeutic Elements: o Slide boards o Begin agility drills o Figure 8s o Gentle loops o Large zig-zags o Swimming o Begin plyometrics at 4 months ? Goals: o Treadmill (walk 1-2 miles at 10-12 min/mile pace) o Return to competitive activities

## 2024-08-14 NOTE — INTERVAL H&P NOTE
The patient has been examined and the H&P has been reviewed:    I concur with the findings and no changes have occurred since H&P was written.    Anesthesia/Surgery risks, benefits and alternative options discussed and understood by patient/family.    We will proceed with right knee arthroscopic ACL reconstruction with autograft versus allograft and MCL repair versus reconstruction with allograft as well as knee arthroscopy      There are no hospital problems to display for this patient.

## 2024-08-14 NOTE — ANESTHESIA PROCEDURE NOTES
Peripheral Block    Patient location during procedure: OR   Block not for primary anesthetic.  Reason for block: at surgeon's request and post-op pain management   Post-op Pain Location: Right knee   Start time: 8/14/2024 11:49 AM  Timeout: 8/14/2024 11:47 AM   End time: 8/14/2024 11:53 AM    Staffing  Authorizing Provider: Jairon Chapa MD  Performing Provider: Jairon Chapa MD    Staffing  Performed by: Jairon Chapa MD  Authorized by: Jairon Chapa MD    Preanesthetic Checklist  Completed: patient identified, risks and benefits discussed, pre-op evaluation and timeout performed  Peripheral Block  Patient position: supine  Prep: ChloraPrep  Block type: adductor canal  Laterality: right  Injection technique: single shot  Needle  Needle localization: ultrasound guidance     Assessment  Injection assessment: negative aspiration    Medications:    Medications: ROPIvacaine (NAROPIN) injection 0.75% - Perineural   40 mL - 8/14/2024 11:53:00 AM    Additional Notes  No complications.

## 2024-08-15 ENCOUNTER — TELEPHONE (OUTPATIENT)
Dept: ORTHOPEDICS | Facility: CLINIC | Age: 29
End: 2024-08-15
Payer: OTHER MISCELLANEOUS

## 2024-08-15 NOTE — TELEPHONE ENCOUNTER
----- Message from Cheryl Ying sent at 8/15/2024  2:05 PM CDT -----  PATIENT IS IN A LOT PAIN SINCE SURGERY CALL BACK NUMBER [phone]

## 2024-08-15 NOTE — ANESTHESIA POSTPROCEDURE EVALUATION
Anesthesia Post Evaluation    Patient: Imer Cagle    Procedure(s) Performed: Procedure(s) (LRB):  RECONSTRUCTION, KNEE, ACL, ARTHROSCOPIC (Right)  RECONSTRUCTION, LIGAMENT (Right)  ARTHROSCOPY, KNEE (Right)    Final Anesthesia Type: general      Patient location during evaluation: PACU  Post-procedure vital signs: reviewed and stable  Pain management: adequate  Airway patency: patent  MYRA mitigation strategies: Multimodal analgesia  PONV status at discharge: No PONV  Anesthetic complications: no      Cardiovascular status: hemodynamically stable  Respiratory status: unassisted  Hydration status: euvolemic  Follow-up not needed.              Vitals Value Taken Time   /87 08/14/24 1801   Temp 37.2 °C (99 °F) 08/14/24 1653   Pulse 97 08/14/24 1805   Resp 18 08/14/24 1800   SpO2 97 % 08/14/24 1805   Vitals shown include unfiled device data.      Event Time   Out of Recovery 17:20:00         Pain/Kaitlin Score: Pain Rating Prior to Med Admin: 8 (8/14/2024  5:41 PM)  Kaitlin Score: 10 (8/14/2024  5:30 PM)  Modified Kaitlin Score: 19 (8/14/2024  6:35 PM)

## 2024-08-16 NOTE — DISCHARGE SUMMARY
[facility]  Discharge Note  Short Stay    Procedure(s) (LRB):  RECONSTRUCTION, KNEE, ACL, ARTHROSCOPIC (Right)  RECONSTRUCTION, LIGAMENT (Right)  ARTHROSCOPY, KNEE (Right)      OUTCOME: Patient tolerated treatment/procedure well without complication and is now ready for discharge.    DISPOSITION: Home or Self Care    FINAL DIAGNOSIS:  Complete tear of MCL of knee, right, initial encounter    FOLLOWUP: In clinic    DISCHARGE INSTRUCTIONS:    Discharge Procedure Orders   Diet general     Call MD for:  temperature >100.4     Call MD for:  persistent nausea and vomiting     Call MD for:  severe uncontrolled pain     Call MD for:  difficulty breathing, headache or visual disturbances     Call MD for:  redness, tenderness, or signs of infection (pain, swelling, redness, odor or green/yellow discharge around incision site)     Call MD for:  hives     Call MD for:  persistent dizziness or light-headedness     Call MD for:  extreme fatigue     Leave dressing on - Keep it clean, dry, and intact until clinic visit     Weight bearing restrictions (specify)   Order Comments: Please refer to op note for therapy plan         Clinical Reference Documents Added to Patient Instructions         Document    ANTERIOR CRUCIATE LIGAMENT TEAR DISCHARGE INSTRUCTIONS (ENGLISH)    KNEE ARTHROSCOPY DISCHARGE INSTRUCTIONS (ENGLISH)    [facility] OPIOID AVS FACTSHEET            TIME SPENT ON DISCHARGE: 9 minutes

## 2024-08-29 ENCOUNTER — OFFICE VISIT (OUTPATIENT)
Dept: ORTHOPEDICS | Facility: CLINIC | Age: 29
End: 2024-08-29
Payer: OTHER MISCELLANEOUS

## 2024-08-29 DIAGNOSIS — S83.411A COMPLETE TEAR OF MCL OF KNEE, RIGHT, INITIAL ENCOUNTER: Primary | ICD-10-CM

## 2024-08-29 DIAGNOSIS — Z98.890 S/P RIGHT KNEE ARTHROSCOPY: ICD-10-CM

## 2024-08-29 PROCEDURE — 99212 OFFICE O/P EST SF 10 MIN: CPT | Mod: PBBFAC | Performed by: NURSE PRACTITIONER

## 2024-08-29 PROCEDURE — 99999 PR PBB SHADOW E&M-EST. PATIENT-LVL II: CPT | Mod: PBBFAC,,, | Performed by: NURSE PRACTITIONER

## 2024-08-29 RX ORDER — OXYCODONE AND ACETAMINOPHEN 10; 325 MG/1; MG/1
1 TABLET ORAL EVERY 6 HOURS PRN
Qty: 30 TABLET | Refills: 0 | Status: SHIPPED | OUTPATIENT
Start: 2024-08-29

## 2024-08-29 NOTE — LETTER
August 29, 2024      [facility]  [address] [place] [address]  Phone: [phone]  Fax: [phone]       Patient: Imer Cagle   YOB: 1995  Date of Visit: 08/29/2024    To Whom It May Concern:    Re Cagle  was at [facility] on 08/29/2024. The patient will remain off until reevaluated at follow up appointment. If you have any questions or concerns, or if I can be of further assistance, please do not hesitate to contact me.    Sincerely,  SHERIN Palomo MA

## 2024-08-29 NOTE — PROGRESS NOTES
HISTORY OF PRESENT ILLNESS:       Reconstruction, Knee, Acl, Arthroscopic - Right, Reconstruction, Ligament - Right, and Arthroscopy, Knee - Right 8/14/2024       Pt is here today for First post-operative followup of his knee arthroscopy.  he is doing well.  We have reviewed his findings and discussed plan of care and future treatment options, including the physical therapy plan.      Patient is 2 weeks postop right knee arthroscopy with ACL reconstruction.  Doing well.  Incisions look good today.  Sutures removed Steri-Strips applied.  He is wearing his brace locked in full extension.  Using crutches today.  No complaints.  Requesting refill on medicine                                                                               PHYSICAL EXAMINATION:     Incision sites healed well  No evidence of any erythema, infection or induration  Range of motion 0-90 degrees  Minimal effusion  2+ DP pulse  No swelling, no calf tenderness  - Jessica's sign  Negative medial joint line tendernes  Moderate quad atrophy                                                                                 ASSESSMENT:                                                                                                                                               1. Status post above, doing well.                                                                                                                               PLAN:                                                                                                                                                     1. Continue with PT  2. Emphasized quad function.  3. I have discussed return to activity in detail.  4. he will see us back in 4 weeks.                                      5. All questions were answered and he should contact us if he  has any questions or concerns in the interim.            Touch weight-bearing.  Brace locked in full extension.  We will set up outpatient  PT.  Requesting refill on pain medication, Percocet refilled.  Return to clinic 4 weeks with Dr. Arnold  There are no Patient Instructions on file for this visit.

## 2024-09-12 ENCOUNTER — CLINICAL SUPPORT (OUTPATIENT)
Dept: REHABILITATION | Facility: HOSPITAL | Age: 29
End: 2024-09-12
Payer: OTHER MISCELLANEOUS

## 2024-09-12 DIAGNOSIS — S83.411A COMPLETE TEAR OF MCL OF KNEE, RIGHT, INITIAL ENCOUNTER: ICD-10-CM

## 2024-09-12 PROCEDURE — 97162 PT EVAL MOD COMPLEX 30 MIN: CPT

## 2024-09-12 NOTE — PLAN OF CARE
[facility]   Physical Therapy Initial Evaluation      Name: Imer Cagle  Clinic Number: [id number]    Therapy Diagnosis:   Encounter Diagnosis   Name Primary?    Complete tear of MCL of knee, right, initial encounter         Physician: Grecia Jovel FNP    Physician Orders: PT Eval and Treat   Medical Diagnosis from Referral: S83.411A (ICD-10-CM) - Complete tear of MCL of knee, right, initial encounter  Evaluation Date: 9/12/2024  Authorization Period Expiration: See referral notes  Plan of Care Expiration: 10/14/24  Progress Note Due: 10/1/2024  Date of Surgery: 7/13/24  Visit # / Visits authorized: 1/12   FOTO: 54/ 100  KOOS Jr: 61    Precautions: Standard     Time In: 3:30 PM  Time Out: 4:00 PM  Total Billable Time: 30 minutes    Subjective       History of current condition - Imer reports: pain noted to be around 3/10 at time of evaluation. He comes donned with knee brace locked in extension ambulating on bilateral crutches and TTWB.     Falls: None    Prior Therapy: None  Social History:  lives with their family  Occupation:   Prior Level of Function: Independent  Current Level of Function: Independent    Pain:  Current 0/10, worst 6/10, best 0/10   Location: right knee    Description: Aching  Aggravating Factors: Bending  Easing Factors: pain medication, ice, and rest    Patients goals: Return to PLOF     Medical History:   No past medical history on file.    Surgical History:   Imer Cagle  has a past surgical history that includes Hand surgery; Knee arthroscopy w/ ACL reconstruction (Right, 8/14/2024); Reconstruction of ligament (Right, 8/14/2024); and Arthroscopy of knee (Right, 8/14/2024).    Medications:   Imer has a current medication list which includes the following prescription(s): aspirin, celecoxib, naproxen, ondansetron, and oxycodone-acetaminophen.    Allergies:   Review of patient's allergies indicates:  No Known Allergies      Objective    Incisions: Dry and Intact  Girth Measurements: Right: Midpatella: 41cm. Malleoli: 28cm. Left: Midpatella: 38cm. Malleoli: 25 cm.  Comments:      Range of motion:  Motion Right Left    Hip flexion   AROM: 100   AROM 110   Hip extension   AROM 5   AROM 10   Hip abduction   AROM: 32   AROM: 28   Hip adduction   AROM: 25   AROM: 25   Internal rotation   AROM: 14  AROM: 30   External rotation   AROM: 25   AROM: 32   Knee extension   AROM: 12 PROM: 8   AROM:0 PROM:0   Knee flexion   AROM: 85 PROM: 93   AROM: 134   Ankle DF  WITHIN FUNCTIONAL LIMITS  WITHIN FUNCTIONAL LIMITS   Ankle PF  WITHIN FUNCTIONAL LIMITS  WITHIN FUNCTIONAL LIMITS   Ankle Inversion  WITHIN FUNCTIONAL LIMITS  WITHIN FUNCTIONAL LIMITS   Ankle Eversion  WITHIN FUNCTIONAL LIMITS  WITHIN FUNCTIONAL LIMITS     7 quad lag    Manual muscle test   Muscle Right  Left    Hip flexion  MMT strength: 3+/5  MMT strength: 5/5   Hip extension  MMT strength: 4/5  MMT strength: 4/5   Hip abduction  MMT strength: 4/5  MMT strength: 4/5   Hip adduction  MMT strength: 4/5  MMT strength: 4/5   Hip internal rotation  MMT strength: 3+/5  MMT strength: 5/5   Hip external rotation  MMT strength: 4/5  MMT strength: 5/5   Knee extension  MMT strength: 4/5  MMT strength: 5/5   Knee flexion  MMT strength: 3+/5  MMT strength: 5/5   Ankle DF  MMT strength: 5/5  MMT strength: 5/5   Ankle PF  MMT strength: 5/5  MMT strength: 5/5   Ankle inversion  MMT strength: 5/5  MMT strength: 5/5   Ankle eversion  MMT strength: 5/5  MMT strength: 5/5       Patient Education and Home Exercises     Education provided:   - on evaluation findings and overall POC    Written Home Exercises Provided:  on initial treatment visit .    Assessment     Imer is a 29 y.o. male referred to outpatient Physical Therapy with a medical diagnosis of right knee arthroscopy. Patient presents with pain, limited passive and active range of motion, difficulty with ambulation, and difficulty with  functional tasks required for his job.     Patient prognosis is Excellent.   Patient will benefit from skilled outpatient Physical Therapy to address the deficits stated above and in the chart below, provide patient /family education, and to maximize patientt's level of independence.     Plan of care discussed with patient: Yes  Patient's spiritual, cultural and educational needs considered and patient is agreeable to the plan of care and goals as stated below:     Anticipated Barriers for therapy: None    Goals:  Short Term Goals: 2 weeks   1. Independent with Home Exercise Program   2. Increase Right Knee Range of Motion to 0 Degrees to 120 Degrees  3. Increase Right Knee Strength to grossly 4+/5  4. Patient will ambulate 500 feet with Normal Gait pattern without complaints of pain     Long Term Goals: 4 weeks   1. Patient will increase Right Knee Strength to grossly 5/5  2. Patient will ambulate 1000+ feet with no complaints of pain or weakness in Right Knee   Plan     Plan of care Certification: 9/12/2024 to 10/14/2024.    Outpatient Physical Therapy 3 times weekly for 4 weeks to include the following interventions: Therapeutic exercise, there act, gait training, neuro re-ed, and patient education as indicated.     Aubrey Verduzco PT        Physician's Signature: _________________________________________ Date: ________________

## 2024-09-16 ENCOUNTER — CLINICAL SUPPORT (OUTPATIENT)
Dept: REHABILITATION | Facility: HOSPITAL | Age: 29
End: 2024-09-16
Payer: OTHER MISCELLANEOUS

## 2024-09-16 DIAGNOSIS — S83.411A COMPLETE TEAR OF MCL OF KNEE, RIGHT, INITIAL ENCOUNTER: Primary | ICD-10-CM

## 2024-09-16 PROCEDURE — 97110 THERAPEUTIC EXERCISES: CPT | Mod: CQ

## 2024-09-16 NOTE — PROGRESS NOTES
[facility]   Physical Therapy Treatment Note      [facility]  [facility] Number: [id number]    Therapy Diagnosis: No diagnosis found.  Physician: Grecia Jovel FNP    Visit Date: 9/16/2024  Physician Orders: PT Eval and Treat   Medical Diagnosis from Referral: S83.411A (ICD-10-CM) - Complete tear of MCL of knee, right, initial encounter  Evaluation Date: 9/12/2024  Authorization Period Expiration: See referral notes  Plan of Care Expiration: 10/14/24  Plan of care Certification: 9/12/2024 to 10/14/2024.  Progress Note Due: 10/1/2024  Date of Surgery: 7/13/24  Visit # / Visits authorized: 2/12   FOTO: 54/ 100  MOSHE Jr: 61     Precautions: Standard      Time In: 1320 PM  Time Out: 1400 PM  Total Billable Time:   40   minutes       PTA Visit #: 1/5       Subjective     Patient reports: minimal pain today walking in.  He was compliant with home exercise program.      Pain: 3/10  Location: right knee      Objective      Objective Measures updated at progress report unless specified.     Treatment     Imer received the treatments listed below:      therapeutic exercises to develop strength, endurance, ROM, and flexibility for 40 minutes including:    NuStep x 5 min  Heel slides 1x15   LAQs 1x15  Marches  SAQs 2x10 with 3 sec hold with 3#  SLRs VMO 2x10 SLR/1x15 VMO  Quad sets 2x10 with GTB   Leg press  HS curls 2x10 GTB   HS stretches  Calf stretches   Hip ADD sidelying 1x15  Hip ABD sidelying 1x15     HEP x 5 min      (Not Today)  neuromuscular re-education activities to improve: Balance, Coordination, Kinesthetic, and Proprioception for        minutes. The following activities were included:  Balance pad standing // bars  SLS // bars  Tandem standing  Ball toss       hot pack for  minutes to .    cold pack for  minutes to .    Patient Education and Home Exercises       Education provided: yes  Written Home Exercises Provided: Yes. Exercises were reviewed and Imer was  able to demonstrate them prior to the end of the session.  Imer demonstrated good  understanding of the education provided. See Electronic Medical Record under Patient Instructions for exercises provided during therapy sessions    Assessment     Pt tolerated Tx well to promote increased R knee ROM and strengthening.  PTA provided skilled cues for proper positioning, PTA educated and demonstrated HEP with pt verbalizing understanding.     Imer Is progressing well towards his goals.   Patient prognosis is Excellent.     Patient will continue to benefit from skilled outpatient physical therapy to address the deficits listed in the problem list box on initial evaluation, provide pt/family education and to maximize pt's level of independence in the home and community environment.     Patient's spiritual, cultural and educational needs considered and pt agreeable to plan of care and goals.     Anticipated barriers to physical therapy: none    Goals:   Goals:  Short Term Goals: 2 weeks   1. Independent with Home Exercise Program   2. Increase Right Knee Range of Motion to 0 Degrees to 120 Degrees  3. Increase Right Knee Strength to grossly 4+/5  4. Patient will ambulate 500 feet with Normal Gait pattern without complaints of pain      Long Term Goals: 4 weeks   1. Patient will increase Right Knee Strength to grossly 5/5  2. Patient will ambulate 1000+ feet with no complaints of pain or weakness in Right Knee     Plan     Pt will continue with POC.    Melissa Black, KEAGAN

## 2024-09-18 ENCOUNTER — CLINICAL SUPPORT (OUTPATIENT)
Dept: REHABILITATION | Facility: HOSPITAL | Age: 29
End: 2024-09-18
Payer: OTHER MISCELLANEOUS

## 2024-09-18 DIAGNOSIS — S83.411A COMPLETE TEAR OF MCL OF KNEE, RIGHT, INITIAL ENCOUNTER: Primary | ICD-10-CM

## 2024-09-18 PROCEDURE — 97110 THERAPEUTIC EXERCISES: CPT | Mod: CQ

## 2024-09-18 NOTE — PROGRESS NOTES
[facility]   Physical Therapy Treatment Note      Name: Imer Munoz [facility]  Clinic Number: [id number]    Therapy Diagnosis:   Encounter Diagnosis   Name Primary?    Complete tear of MCL of knee, right, initial encounter Yes     Physician: Grecia Jovel FNP    Visit Date: 9/18/2024  Physician Orders: PT Eval and Treat   Medical Diagnosis from Referral: S83.411A (ICD-10-CM) - Complete tear of MCL of knee, right, initial encounter  Evaluation Date: 9/12/2024  Authorization Period Expiration: See referral notes  Plan of Care Expiration: 10/14/24  Plan of care Certification: 9/12/2024 to 10/14/2024.  Progress Note Due: 10/1/2024  Date of Surgery: 7/13/24  Visit # / Visits authorized: 3/12   FOTO: 54/ 100  MOSHE Jr: 61     Precautions: Standard      Time In: 1315 PM  Time Out: 1400  PM  Total Billable Time: 45   minutes       PTA Visit #: 2/5       Subjective     Patient reports: minimal pain today walking in.  He was compliant with home exercise program.      Pain: 2/10  Location: right knee      Objective      Objective Measures updated at progress report unless specified.     Treatment     Imer received the treatments listed below:      therapeutic exercises to develop strength, endurance, ROM, and flexibility for 45 minutes including:    NuStep x 5 min   Heel slides 2x10  seated edge of mat  LAQs 2x10 with 20# (1x10 today, limited RLE flexion)   SAQs 2x10 with 3 sec hold with 3#  SLRs VMO 2x10 SLR/1x15 VMO  Quad sets 2x10 with GTB   Leg press 1x15 with 50#   HS curls 2x10 with 20#   HS stretches 4x15 sec   Calf stretches (not today)    Hip ADD sidelying 2x10 with 3#  Hip ABD sidelying 2x10 with 3#           (Not Today)  neuromuscular re-education activities to improve: Balance, Coordination, Kinesthetic, and Proprioception for        minutes. The following activities were included:  Balance pad standing // bars  SLS // bars  Tandem standing  Ball toss       hot pack for  minutes to  .    cold pack for  minutes to .    Patient Education and Home Exercises       Education provided: yes  Written Home Exercises Provided: Yes. Exercises were reviewed and Imer was able to demonstrate them prior to the end of the session.  Imer demonstrated good  understanding of the education provided. See Electronic Medical Record under Patient Instructions for exercises provided during therapy sessions    Assessment     Pt tolerated Tx well to promote increased R knee ROM and strengthening.  PTA provided skilled cues for proper positioning    Imer Is progressing well towards his goals.   Patient prognosis is Excellent.     Patient will continue to benefit from skilled outpatient physical therapy to address the deficits listed in the problem list box on initial evaluation, provide pt/family education and to maximize pt's level of independence in the home and community environment.     Patient's spiritual, cultural and educational needs considered and pt agreeable to plan of care and goals.     Anticipated barriers to physical therapy: none    Goals:   Goals:  Short Term Goals: 2 weeks   1. Independent with Home Exercise Program   2. Increase Right Knee Range of Motion to 0 Degrees to 120 Degrees  3. Increase Right Knee Strength to grossly 4+/5  4. Patient will ambulate 500 feet with Normal Gait pattern without complaints of pain      Long Term Goals: 4 weeks   1. Patient will increase Right Knee Strength to grossly 5/5  2. Patient will ambulate 1000+ feet with no complaints of pain or weakness in Right Knee     Plan     Pt will continue with POC.    Melissa Black, PTA

## 2024-09-20 ENCOUNTER — CLINICAL SUPPORT (OUTPATIENT)
Dept: REHABILITATION | Facility: HOSPITAL | Age: 29
End: 2024-09-20
Payer: OTHER MISCELLANEOUS

## 2024-09-20 DIAGNOSIS — S83.411A COMPLETE TEAR OF MCL OF KNEE, RIGHT, INITIAL ENCOUNTER: Primary | ICD-10-CM

## 2024-09-20 PROCEDURE — 97110 THERAPEUTIC EXERCISES: CPT

## 2024-09-20 NOTE — PROGRESS NOTES
[facility]   Physical Therapy Treatment Note      Name: Imer Munoz [facility], [place], operated by [facility]  Clinic Number: [id number]    Therapy Diagnosis:   Encounter Diagnosis   Name Primary?    Complete tear of MCL of knee, right, initial encounter Yes     Physician: Grecia Jovel FNP    Visit Date: 9/20/2024  Physician Orders: PT Eval and Treat   Medical Diagnosis from Referral: S83.411A (ICD-10-CM) - Complete tear of MCL of knee, right, initial encounter  Evaluation Date: 9/12/2024  Authorization Period Expiration: See referral notes  Plan of Care Expiration: 10/14/24  Plan of care Certification: 9/12/2024 to 10/14/2024.  Progress Note Due: 10/1/2024  Date of Surgery: 7/13/24  Visit # / Visits authorized: 4/12   FOTO: 54/ 100  MOSHE Jr: 61     Precautions: Standard      Time In: 1317  Time Out: 1402  Total Billable Time: 45 minutes       PTA Visit #: 0/5       Subjective     Patient reports: minimal pain today walking in.  He was compliant with home exercise program.      Pain: 2/10  Location: right knee      Objective      Objective Measures updated at progress report unless specified.     Treatment     Imer received the treatments listed below:      therapeutic exercises to develop strength, endurance, ROM, and flexibility for 45 minutes including:    NuStep x 5 min   Heel slides 2x10  seated edge of mat  LAQs 2x10 with 20#   SAQs 2x10 with 3 sec hold with 3#  SLRs VMO 2x10   Quad sets 2x10 with GTB   Leg press 1x20 with 50#   HS curls 2x10 with 30#   HS stretches 4x15 sec   Calf stretches 4 x 30 secs  Hip ADD in long siting with a ball 2 x 10.  Hip ABD sidelying 2x10 with 3# (last 10 in a little bit of hip extension)          (Not Today)  neuromuscular re-education activities to improve: Balance, Coordination, Kinesthetic, and Proprioception for        minutes. The following activities were included:  Balance pad standing // bars  SLS // bars  Tandem standing  Ball toss       hot pack for  minutes to  .    cold pack for  minutes to .    Patient Education and Home Exercises       Education provided: yes  Written Home Exercises Provided: Yes. Exercises were reviewed and Imer was able to demonstrate them prior to the end of the session.  Imer demonstrated good  understanding of the education provided. See Electronic Medical Record under Patient Instructions for exercises provided during therapy sessions    Assessment     Pt tolerated Tx well to promote increased R knee ROM and strengthening.  PTA provided skilled cues for proper positioning    Imer Is progressing well towards his goals.   Patient prognosis is Excellent.     Patient will continue to benefit from skilled outpatient physical therapy to address the deficits listed in the problem list box on initial evaluation, provide pt/family education and to maximize pt's level of independence in the home and community environment.     Patient's spiritual, cultural and educational needs considered and pt agreeable to plan of care and goals.     Anticipated barriers to physical therapy: none    Goals:   Goals:  Short Term Goals: 2 weeks   1. Independent with Home Exercise Program   2. Increase Right Knee Range of Motion to 0 Degrees to 120 Degrees  3. Increase Right Knee Strength to grossly 4+/5  4. Patient will ambulate 500 feet with Normal Gait pattern without complaints of pain      Long Term Goals: 4 weeks   1. Patient will increase Right Knee Strength to grossly 5/5  2. Patient will ambulate 1000+ feet with no complaints of pain or weakness in Right Knee     Plan     Pt will continue with POC.    Aubrey Verduzco, PT

## 2024-09-23 ENCOUNTER — CLINICAL SUPPORT (OUTPATIENT)
Dept: REHABILITATION | Facility: HOSPITAL | Age: 29
End: 2024-09-23
Payer: OTHER MISCELLANEOUS

## 2024-09-23 DIAGNOSIS — S83.411A COMPLETE TEAR OF MCL OF KNEE, RIGHT, INITIAL ENCOUNTER: Primary | ICD-10-CM

## 2024-09-23 PROCEDURE — 97110 THERAPEUTIC EXERCISES: CPT

## 2024-09-23 NOTE — PROGRESS NOTES
"[facility]   Physical Therapy Treatment Note      Name: Imer Cagle  Clinic Number: [id number]    Therapy Diagnosis:   Encounter Diagnosis   Name Primary?    Complete tear of MCL of knee, right, initial encounter Yes     Physician: Grecia Jovel FNP    Visit Date: 9/23/2024  Physician Orders: PT Eval and Treat   Medical Diagnosis from Referral: S83.411A (ICD-10-CM) - Complete tear of MCL of knee, right, initial encounter  Evaluation Date: 9/12/2024  Authorization Period Expiration: See referral notes  Plan of Care Expiration: 10/14/24  Plan of care Certification: 9/12/2024 to 10/14/2024.  Progress Note Due: 10/1/2024  Date of Surgery: 7/13/24  Visit # / Visits authorized: 4/12   FOTO: 54/ 100  MOSHE Jr: 61     Precautions: Standard      Time In: 1318  Time Out: 1408  Total Billable Time: 50 mins       PTA Visit #: 0/5       Subjective     Patient reports: minimal pain today walking in. Patient reports he fell last night. Patient states, "he pulled something from the fall". Says he has a knot on the lateral part of the knee now.   He was compliant with home exercise program.      Pain: 3/10  Location: right knee      Objective      Objective Measures updated at progress report unless specified.     Patient had a small swollen knot on the mid lateral aspect of the knee joint. It was painful to the touch and felt like a sac of fluid.     Treatment     Imer received the treatments listed below:      therapeutic exercises to develop strength, endurance, ROM, and flexibility for 48 minutes including:    NuStep x 5 min   Heel slides 2x10  seated edge of mat  Sitting TKEs into ball in the corner x 30  SAQs 2x10 with 3 sec hold with 3#  SLRs VMO 2x10   Quad sets 2x10 with GTB   Leg press 1x20 with 50#   HS curls 2x10 with 30#   HS stretches 4x15 sec   Calf stretches 4 x 30 secs  Hip ADD in parallel bars with red band (band placed about knee joint to avoid incisions)  Hip ABD " sidelying 2x10 with 3# (last 10 in a little bit of hip extension)    Gait Training for 2 mins:  Patient was educated on the use of one crutch with gait. Crutches were readjusted for patient's height.       (Not Today)  neuromuscular re-education activities to improve: Balance, Coordination, Kinesthetic, and Proprioception for        minutes. The following activities were included:  Balance pad standing // bars  SLS // bars  Tandem standing  Ball toss       hot pack for  minutes to .    cold pack for  minutes to .    Patient Education and Home Exercises       Education provided: yes  Written Home Exercises Provided: Yes. Exercises were reviewed and Imer was able to demonstrate them prior to the end of the session.  Imer demonstrated good  understanding of the education provided. See Electronic Medical Record under Patient Instructions for exercises provided during therapy sessions    Assessment     Pt tolerated Tx well to promote increased R knee ROM and strengthening.  PTA provided skilled cues for proper positioning    Imer Is progressing well towards his goals.   Patient prognosis is Excellent.     Patient will continue to benefit from skilled outpatient physical therapy to address the deficits listed in the problem list box on initial evaluation, provide pt/family education and to maximize pt's level of independence in the home and community environment.     Patient's spiritual, cultural and educational needs considered and pt agreeable to plan of care and goals.     Anticipated barriers to physical therapy: none    Goals:   Goals:  Short Term Goals: 2 weeks   1. Independent with Home Exercise Program   2. Increase Right Knee Range of Motion to 0 Degrees to 120 Degrees  3. Increase Right Knee Strength to grossly 4+/5  4. Patient will ambulate 500 feet with Normal Gait pattern without complaints of pain      Long Term Goals: 4 weeks   1. Patient will increase Right Knee Strength to grossly 5/5  2. Patient  will ambulate 1000+ feet with no complaints of pain or weakness in Right Knee     Plan     Pt will continue with POC.    Aubrey Verduzco, PT

## 2024-09-25 ENCOUNTER — CLINICAL SUPPORT (OUTPATIENT)
Dept: REHABILITATION | Facility: HOSPITAL | Age: 29
End: 2024-09-25
Payer: OTHER MISCELLANEOUS

## 2024-09-25 DIAGNOSIS — S83.411A COMPLETE TEAR OF MCL OF KNEE, RIGHT, INITIAL ENCOUNTER: Primary | ICD-10-CM

## 2024-09-25 PROCEDURE — 97110 THERAPEUTIC EXERCISES: CPT

## 2024-09-25 NOTE — PROGRESS NOTES
"[facility]   Physical Therapy Treatment Note      Name: Imer Munoz [facility]  Clinic Number: [id number]    Therapy Diagnosis:   Encounter Diagnosis   Name Primary?    Complete tear of MCL of knee, right, initial encounter Yes     Physician: Grecia Jovel FNP    Visit Date: 9/25/2024  Physician Orders: PT Eval and Treat   Medical Diagnosis from Referral: S83.411A (ICD-10-CM) - Complete tear of MCL of knee, right, initial encounter  Evaluation Date: 9/12/2024  Authorization Period Expiration: See referral notes  Plan of Care Expiration: 10/14/24  Plan of care Certification: 9/12/2024 to 10/14/2024.  Progress Note Due: 10/1/2024  Date of Surgery: 7/13/24  Visit # / Visits authorized: 5/12   FOTO: 54/ 100  MOSHE Jr: 61     Precautions: Standard      Time In: 1318  Time Out: 1406  Total Billable Time: 48 mins       PTA Visit #: 0/5       Subjective     Patient reports: Patient states, "Yesterday my knee was popping about every step I took".   He was compliant with home exercise program.      Pain: 2/10  Location: right knee      Objective      Objective Measures updated at progress report unless specified.     Patient had a small swollen knot on the mid lateral aspect of the knee joint that has now resolved on its own.    Treatment     Imer received the treatments listed below:      therapeutic exercises to develop strength, endurance, ROM, and flexibility for 46 minutes including:    NuStep x 5 min   Heel slides 2x10  seated edge of mat  Sitting TKEs into ball in the corner x 30  SAQs 2x10 with 3 sec hold with 3#  LAQ (90-30 degrees) with 10# ankle weight  SLRs VMO 2x10   Quad sets 2x10 with GTB   Leg press 1x20 with 50#   HS curls 2x10 with 30#   HS stretches 4x20 secs  Calf stretches 4 x 30 secs  Hip ADD in parallel bars with red band (band placed above knee joint to avoid incisions)  Hip ABD sidelying 2x10 with 3# (last 10 in a little bit of hip extension)    Gait Training for 0 " mins: (not today)  Patient was educated on the use of one crutch with gait. Crutches were readjusted for patient's height.     Manual Therapy for 2 mins:  Manual knee extension in long sitting 4 x 20s    (Not Today)  neuromuscular re-education activities to improve: Balance, Coordination, Kinesthetic, and Proprioception for        minutes. The following activities were included:  Balance pad standing // bars  SLS // bars  Tandem standing  Ball toss       hot pack for  minutes to .    cold pack for  minutes to .    Patient Education and Home Exercises       Education provided: yes  Written Home Exercises Provided: Yes. Exercises were reviewed and Imer was able to demonstrate them prior to the end of the session.  Imer demonstrated good  understanding of the education provided. See Electronic Medical Record under Patient Instructions for exercises provided during therapy sessions    Assessment     Pt tolerated Tx well to promote increased R knee ROM and strengthening.  PTA provided skilled cues for proper positioning    Imer Is progressing well towards his goals.   Patient prognosis is Excellent.     Patient will continue to benefit from skilled outpatient physical therapy to address the deficits listed in the problem list box on initial evaluation, provide pt/family education and to maximize pt's level of independence in the home and community environment.     Patient's spiritual, cultural and educational needs considered and pt agreeable to plan of care and goals.     Anticipated barriers to physical therapy: none    Goals:   Goals:  Short Term Goals: 2 weeks   1. Independent with Home Exercise Program   2. Increase Right Knee Range of Motion to 0 Degrees to 120 Degrees  3. Increase Right Knee Strength to grossly 4+/5  4. Patient will ambulate 500 feet with Normal Gait pattern without complaints of pain      Long Term Goals: 4 weeks   1. Patient will increase Right Knee Strength to grossly 5/5  2. Patient  will ambulate 1000+ feet with no complaints of pain or weakness in Right Knee     Plan     Pt will continue with POC.    Aubrey Verduzco, PT

## 2024-09-27 ENCOUNTER — CLINICAL SUPPORT (OUTPATIENT)
Dept: REHABILITATION | Facility: HOSPITAL | Age: 29
End: 2024-09-27
Payer: OTHER MISCELLANEOUS

## 2024-09-27 DIAGNOSIS — S83.411A COMPLETE TEAR OF MCL OF KNEE, RIGHT, INITIAL ENCOUNTER: Primary | ICD-10-CM

## 2024-09-27 PROCEDURE — 97110 THERAPEUTIC EXERCISES: CPT

## 2024-09-27 NOTE — PROGRESS NOTES
[facility]   Physical Therapy Treatment Note      Name: Imer Munoz [facility] Number: [id number]    Therapy Diagnosis:   Encounter Diagnosis   Name Primary?    Complete tear of MCL of knee, right, initial encounter Yes     Physician: Grecia Jovel FNP    Visit Date: 9/27/2024  Physician Orders: PT Eval and Treat   Medical Diagnosis from Referral: S83.411A (ICD-10-CM) - Complete tear of MCL of knee, right, initial encounter  Evaluation Date: 9/12/2024  Authorization Period Expiration: See referral notes  Plan of Care Expiration: 10/14/24  Plan of care Certification: 9/12/2024 to 10/14/2024.  Progress Note Due: 10/1/2024  Date of Surgery: 7/13/24  Visit # / Visits authorized: 6/12   FOTO: 54/ 100  MOSHE Jr: 61     Precautions: Standard      Time In: 1318  Time Out: 1402  Total Billable Time: 44 mins       PTA Visit #: 0/5       Subjective     Patient reports: Patient states he has had a little pain, but not much.  He was compliant with home exercise program.      Pain: 1/10  Location: right knee      Objective      Objective Measures updated at progress report unless specified.     Patient had a small swollen knot on the mid lateral aspect of the knee joint that has now resolved on its own.    Treatment     Imer received the treatments listed below:      therapeutic exercises to develop strength, endurance, ROM, and flexibility for 42 minutes including:    NuStep x 5 min   Calf stretches 4 x 30 secs  HS curls 2x10 with 30#   LAQ (90-30 degrees) with 10# ankle weight  Leg press 1x30 with 50#   Step Ups on 4 in step x 20  Hip ADD in parallel bars with red band (band placed above knee joint to avoid incisions) x 20 (not today)  Sitting TKEs into ball in the corner x 30  Heel slides 2x10  seated edge of mat  SAQs 2x10 with 3 sec hold with 3#  SLRs VMO 2x10   Quad sets 2x10 with GTB   HS stretches 4x20 secs  Hip ABD sidelying 2x10 with 3# (last 10 in a little bit of hip  extension)    Gait Training for 0 mins: (not today)  Patient was educated on the use of one crutch with gait. Crutches were readjusted for patient's height.     Manual Therapy for 2 mins:  Manual knee extension in long sitting 4 x 20s    (Not Today)  neuromuscular re-education activities to improve: Balance, Coordination, Kinesthetic, and Proprioception for        minutes. The following activities were included:  Balance pad standing // bars  SLS // bars  Tandem standing  Ball toss       hot pack for  minutes to .    cold pack for  minutes to .    Patient Education and Home Exercises       Education provided: yes  Written Home Exercises Provided: Yes. Exercises were reviewed and Imer was able to demonstrate them prior to the end of the session.  Imer demonstrated good  understanding of the education provided. See Electronic Medical Record under Patient Instructions for exercises provided during therapy sessions    Assessment     Pt tolerated Tx well to promote increased R knee ROM and strengthening.  PTA provided skilled cues for proper positioning    Imer Is progressing well towards his goals.   Patient prognosis is Excellent.     Patient will continue to benefit from skilled outpatient physical therapy to address the deficits listed in the problem list box on initial evaluation, provide pt/family education and to maximize pt's level of independence in the home and community environment.     Patient's spiritual, cultural and educational needs considered and pt agreeable to plan of care and goals.     Anticipated barriers to physical therapy: none    Goals:   Goals:  Short Term Goals: 2 weeks   1. Independent with Home Exercise Program   2. Increase Right Knee Range of Motion to 0 Degrees to 120 Degrees  3. Increase Right Knee Strength to grossly 4+/5  4. Patient will ambulate 500 feet with Normal Gait pattern without complaints of pain      Long Term Goals: 4 weeks   1. Patient will increase Right Knee  Strength to grossly 5/5  2. Patient will ambulate 1000+ feet with no complaints of pain or weakness in Right Knee     Plan     Pt will continue with POC.    Aubrey Verduzco, PT

## 2024-09-29 ENCOUNTER — HOSPITAL ENCOUNTER (EMERGENCY)
Facility: HOSPITAL | Age: 29
Discharge: HOME | End: 2024-09-29
Payer: MEDICAID

## 2024-09-29 VITALS
DIASTOLIC BLOOD PRESSURE: 91 MMHG | BODY MASS INDEX: 28.58 KG/M2 | SYSTOLIC BLOOD PRESSURE: 157 MMHG | TEMPERATURE: 98 F | WEIGHT: 211 LBS | HEART RATE: 88 BPM | RESPIRATION RATE: 19 BRPM | HEIGHT: 72 IN | OXYGEN SATURATION: 97 %

## 2024-09-29 DIAGNOSIS — G51.0 BELL'S PALSY: Primary | ICD-10-CM

## 2024-09-29 DIAGNOSIS — R03.0 ELEVATED BLOOD PRESSURE READING: ICD-10-CM

## 2024-09-29 LAB
ALBUMIN SERPL BCP-MCNC: 4.3 G/DL (ref 3.5–5)
ALBUMIN/GLOB SERPL: 1.3 {RATIO}
ALP SERPL-CCNC: 61 U/L (ref 45–115)
ALT SERPL W P-5'-P-CCNC: 30 U/L (ref 16–61)
ANION GAP SERPL CALCULATED.3IONS-SCNC: 14 MMOL/L (ref 7–16)
AST SERPL W P-5'-P-CCNC: 9 U/L (ref 15–37)
BASOPHILS # BLD AUTO: 0.05 K/UL (ref 0–0.2)
BASOPHILS NFR BLD AUTO: 0.7 % (ref 0–1)
BILIRUB SERPL-MCNC: 0.4 MG/DL (ref ?–1.2)
BILIRUB UR QL STRIP: NEGATIVE
BUN SERPL-MCNC: 15 MG/DL (ref 7–18)
BUN/CREAT SERPL: 15 (ref 6–20)
CALCIUM SERPL-MCNC: 8.9 MG/DL (ref 8.5–10.1)
CHLORIDE SERPL-SCNC: 109 MMOL/L (ref 98–107)
CLARITY UR: CLEAR
CO2 SERPL-SCNC: 30 MMOL/L (ref 21–32)
COLOR UR: YELLOW
CREAT SERPL-MCNC: 1 MG/DL (ref 0.7–1.3)
DIFFERENTIAL METHOD BLD: ABNORMAL
EGFR (NO RACE VARIABLE) (RUSH/TITUS): 104 ML/MIN/1.73M2
EOSINOPHIL # BLD AUTO: 0.33 K/UL (ref 0–0.5)
EOSINOPHIL NFR BLD AUTO: 4.7 % (ref 1–4)
ERYTHROCYTE [DISTWIDTH] IN BLOOD BY AUTOMATED COUNT: 12.6 % (ref 11.5–14.5)
GLOBULIN SER-MCNC: 3.4 G/DL (ref 2–4)
GLUCOSE SERPL-MCNC: 130 MG/DL (ref 74–106)
GLUCOSE UR STRIP-MCNC: NEGATIVE MG/DL
HCT VFR BLD AUTO: 44.6 % (ref 40–54)
HGB BLD-MCNC: 14.9 G/DL (ref 13.5–18)
KETONES UR STRIP-SCNC: ABNORMAL MG/DL
LEUKOCYTE ESTERASE UR QL STRIP: NEGATIVE
LYMPHOCYTES # BLD AUTO: 2.38 K/UL (ref 1–4.8)
LYMPHOCYTES NFR BLD AUTO: 33.7 % (ref 27–41)
MCH RBC QN AUTO: 29.9 PG (ref 27–31)
MCHC RBC AUTO-ENTMCNC: 33.4 G/DL (ref 32–36)
MCV RBC AUTO: 89.6 FL (ref 80–96)
MONOCYTES # BLD AUTO: 0.61 K/UL (ref 0–0.8)
MONOCYTES NFR BLD AUTO: 8.6 % (ref 2–6)
MPC BLD CALC-MCNC: 10.2 FL (ref 9.4–12.4)
NEUTROPHILS # BLD AUTO: 3.7 K/UL (ref 1.8–7.7)
NEUTROPHILS NFR BLD AUTO: 52.3 % (ref 53–65)
NITRITE UR QL STRIP: NEGATIVE
OHS QRS DURATION: 96 MS
OHS QTC CALCULATION: 441 MS
PH UR STRIP: 6 PH UNITS
PLATELET # BLD AUTO: 190 K/UL (ref 150–400)
POTASSIUM SERPL-SCNC: 3.6 MMOL/L (ref 3.5–5.1)
PROT SERPL-MCNC: 7.7 G/DL (ref 6.4–8.2)
PROT UR QL STRIP: NEGATIVE
RBC # BLD AUTO: 4.98 M/UL (ref 4.6–6.2)
RBC # UR STRIP: NEGATIVE /UL
SODIUM SERPL-SCNC: 149 MMOL/L (ref 136–145)
SP GR UR STRIP: >=1.03
UROBILINOGEN UR STRIP-ACNC: 1 MG/DL
WBC # BLD AUTO: 7.07 K/UL (ref 4.5–11)

## 2024-09-29 PROCEDURE — 80053 COMPREHEN METABOLIC PANEL: CPT | Performed by: NURSE PRACTITIONER

## 2024-09-29 PROCEDURE — 99284 EMERGENCY DEPT VISIT MOD MDM: CPT | Mod: ,,, | Performed by: NURSE PRACTITIONER

## 2024-09-29 PROCEDURE — 85025 COMPLETE CBC W/AUTO DIFF WBC: CPT | Performed by: NURSE PRACTITIONER

## 2024-09-29 PROCEDURE — 25000003 PHARM REV CODE 250: Performed by: NURSE PRACTITIONER

## 2024-09-29 PROCEDURE — 36415 COLL VENOUS BLD VENIPUNCTURE: CPT | Performed by: NURSE PRACTITIONER

## 2024-09-29 PROCEDURE — 63600175 PHARM REV CODE 636 W HCPCS: Performed by: NURSE PRACTITIONER

## 2024-09-29 PROCEDURE — 99285 EMERGENCY DEPT VISIT HI MDM: CPT | Mod: 25

## 2024-09-29 PROCEDURE — 81003 URINALYSIS AUTO W/O SCOPE: CPT | Performed by: NURSE PRACTITIONER

## 2024-09-29 PROCEDURE — 93005 ELECTROCARDIOGRAM TRACING: CPT

## 2024-09-29 RX ORDER — VALACYCLOVIR HYDROCHLORIDE 1 G/1
1000 TABLET, FILM COATED ORAL 3 TIMES DAILY
Qty: 21 TABLET | Refills: 0 | Status: SHIPPED | OUTPATIENT
Start: 2024-09-29 | End: 2024-10-06

## 2024-09-29 RX ORDER — PREDNISONE 20 MG/1
60 TABLET ORAL DAILY
Qty: 18 TABLET | Refills: 0 | Status: SHIPPED | OUTPATIENT
Start: 2024-09-30 | End: 2024-10-06

## 2024-09-29 RX ORDER — MINERAL OIL AND PETROLATUM 150; 830 MG/G; MG/G
OINTMENT OPHTHALMIC NIGHTLY
Qty: 3.5 G | Refills: 0 | Status: SHIPPED | OUTPATIENT
Start: 2024-09-29

## 2024-09-29 RX ORDER — PREDNISONE 20 MG/1
60 TABLET ORAL
Status: COMPLETED | OUTPATIENT
Start: 2024-09-29 | End: 2024-09-29

## 2024-09-29 RX ORDER — POLYVINYL ALCOHOL 14 MG/ML
1 SOLUTION/ DROPS OPHTHALMIC
Qty: 15 ML | Refills: 0 | Status: SHIPPED | OUTPATIENT
Start: 2024-09-29

## 2024-09-29 RX ORDER — ACETAMINOPHEN 500 MG
1000 TABLET ORAL
Status: COMPLETED | OUTPATIENT
Start: 2024-09-29 | End: 2024-09-29

## 2024-09-29 RX ORDER — BUPRENORPHINE AND NALOXONE 8; 2 MG/1; MG/1
1 FILM, SOLUBLE BUCCAL; SUBLINGUAL DAILY
COMMUNITY
Start: 2024-09-19

## 2024-09-29 RX ADMIN — PREDNISONE 60 MG: 20 TABLET ORAL at 07:09

## 2024-09-29 RX ADMIN — ACETAMINOPHEN 1000 MG: 500 TABLET ORAL at 06:09

## 2024-09-29 NOTE — ED NOTES
Eye patch applied to right eye, pt is unable to blink yet can close this eye when he concentrates on closing it.

## 2024-09-29 NOTE — ED PROVIDER NOTES
Encounter Date: 9/29/2024       History     Chief Complaint   Patient presents with    Facial Droop     Weakness and tingling on the right side of his face since 4pm yesterday. Pt states his symptoms persist and seeks evaluation at the ed.      30 y/o M arrived to the ED via POV with complaint of unable to blink right eye, ringing of right ear with popping, tingling of right side of face and generalized weakness that started suddenly at 4 pm yesterday.Denies headache, dizziness, unilateral weakness or any recent illness.  His wife told him that his speech was slurred this morning, but has resolved.  Last known well 3:50 pm.    The history is provided by the patient.     Review of patient's allergies indicates:  No Known Allergies  Past Medical History:   Diagnosis Date    Hypertension      Past Surgical History:   Procedure Laterality Date    ARTHROSCOPY OF KNEE Right 8/14/2024    Procedure: ARTHROSCOPY, KNEE;  Surgeon: Anoop Arnold MD;  Location: [facility];  Service: Orthopedics;  Laterality: Right;    HAND SURGERY      KNEE ARTHROSCOPY W/ ACL RECONSTRUCTION Right 8/14/2024    Procedure: RECONSTRUCTION, KNEE, ACL, ARTHROSCOPIC;  Surgeon: Anoop Arnold MD;  Location: [facility] OR;  Service: Orthopedics;  Laterality: Right;  anterior cruciate ligament reconstruction with semitendinosis allograft    RECONSTRUCTION OF LIGAMENT Right 8/14/2024    Procedure: RECONSTRUCTION, LIGAMENT;  Surgeon: Anoop Arnold MD;  Location: [facility];  Service: Orthopedics;  Laterality: Right;  medial collateral ligament reconstruction utilizing allograft, open repair of posterior oblique ligament and medial patellofemoral ligament     Family History   Problem Relation Name Age of Onset    Cancer Father      Diabetes Father      Hypertension Father      Heart disease Father       Social History     Tobacco Use    Smoking status: Every Day     Current packs/day: 1.00     Types: Cigarettes   Substance Use Topics     Alcohol use: No    Drug use: No     Review of Systems   Constitutional:  Negative for activity change, appetite change, fatigue and fever.   HENT:  Negative for congestion, ear pain, facial swelling, hearing loss, postnasal drip, sinus pressure, sinus pain, sore throat and trouble swallowing.         Right ear popping and ringing   Eyes:  Positive for redness. Negative for photophobia, pain, discharge, itching and visual disturbance.        Difficulty closing right eyelid   Respiratory:  Negative for cough, chest tightness, shortness of breath and wheezing.    Cardiovascular:  Negative for chest pain, palpitations and leg swelling.   Gastrointestinal:  Negative for abdominal pain, constipation, diarrhea, nausea and vomiting.   Genitourinary:  Negative for dysuria, flank pain, frequency and hematuria.   Musculoskeletal:  Negative for arthralgias, back pain, gait problem, myalgias, neck pain and neck stiffness.   Skin: Negative.    Neurological:  Positive for facial asymmetry, weakness (generalized) and numbness (tingling on right side of face). Negative for dizziness, syncope, speech difficulty (slurred speech, but has resolved), light-headedness and headaches.   Hematological: Negative.    Psychiatric/Behavioral: Negative.         Physical Exam     Initial Vitals [09/29/24 0519]   BP Pulse Resp Temp SpO2   (!) 169/101 109 20 98.3 °F (36.8 °C) 98 %      MAP       --         Physical Exam    Nursing note and vitals reviewed.  Constitutional: He appears well-developed and well-nourished. He is cooperative.  Non-toxic appearance. He does not have a sickly appearance. He does not appear ill. No distress.   HENT:   Head: Normocephalic and atraumatic.   Right Ear: External ear and ear canal normal.   Left Ear: Tympanic membrane, external ear and ear canal normal.   Nose: Nose normal. Right sinus exhibits no maxillary sinus tenderness and no frontal sinus tenderness. Left sinus exhibits no maxillary sinus tenderness and no  frontal sinus tenderness. Mouth/Throat: Uvula is midline, oropharynx is clear and moist and mucous membranes are normal.   Right TM intact without redness, dull light reflex noted.   Eyes: Conjunctivae, EOM and lids are normal. Pupils are equal, round, and reactive to light.   Unable to completely close right eyelid. Eyes red and watering   Neck: Neck supple. Carotid bruit is not present. No JVD present.   Normal range of motion.   Full passive range of motion without pain.     Cardiovascular:  Normal rate, regular rhythm, normal heart sounds, intact distal pulses and normal pulses.           Pulmonary/Chest: Effort normal and breath sounds normal.   Abdominal: Abdomen is soft and flat. Bowel sounds are normal. There is no abdominal tenderness.   Musculoskeletal:         General: Normal range of motion.      Cervical back: Full passive range of motion without pain, normal range of motion and neck supple.      Comments: Right knee brace intact     Lymphadenopathy:     He has no cervical adenopathy.   Neurological: He is alert and oriented to person, place, and time. He has normal strength. A cranial nerve deficit and sensory deficit is present. Coordination and gait normal. GCS score is 15. GCS eye subscore is 4. GCS verbal subscore is 5. GCS motor subscore is 6.   Altered sensation to light touch on forehead and right upper cheek of face.    Skin: Skin is warm, dry and intact. Capillary refill takes less than 2 seconds. No rash noted.   Psychiatric: He has a normal mood and affect. His speech is normal and behavior is normal. Judgment normal.         Medical Screening Exam   See Full Note    ED Course   Procedures  Labs Reviewed   COMPREHENSIVE METABOLIC PANEL - Abnormal       Result Value    Sodium 149 (*)     Potassium 3.6      Chloride 109 (*)     CO2 30      Anion Gap 14      Glucose 130 (*)     BUN 15      Creatinine 1.00      BUN/Creatinine Ratio 15      Calcium 8.9      Total Protein 7.7      Albumin 4.3       Globulin 3.4      A/G Ratio 1.3      Bilirubin, Total 0.4      Alk Phos 61      ALT 30      AST 9 (*)     eGFR 104     URINALYSIS, REFLEX TO URINE CULTURE - Abnormal    Color, UA Yellow      Clarity, UA Clear      pH, UA 6.0      Leukocytes, UA Negative      Nitrites, UA Negative      Protein, UA Negative      Glucose, UA Negative      Ketones, UA Trace      Urobilinogen, UA 1.0      Bilirubin, UA Negative      Blood, UA Negative      Specific Gravity, UA >=1.030 (*)    CBC WITH DIFFERENTIAL - Abnormal    WBC 7.07      RBC 4.98      Hemoglobin 14.9      Hematocrit 44.6      MCV 89.6      MCH 29.9      MCHC 33.4      RDW 12.6      Platelet Count 190      MPV 10.2      Neutrophils % 52.3 (*)     Lymphocytes % 33.7      Neutrophils, Abs 3.70      Lymphocytes, Absolute 2.38      Diff Type Auto      Monocytes % 8.6 (*)     Eosinophils % 4.7 (*)     Basophils % 0.7      Monocytes, Absolute 0.61      Eosinophils, Absolute 0.33      Basophils, Absolute 0.05     CBC W/ AUTO DIFFERENTIAL    Narrative:     The following orders were created for panel order CBC auto differential.  Procedure                               Abnormality         Status                     ---------                               -----------         ------                     CBC with Differential[[id number]]       Abnormal            Final result                 Please view results for these tests on the individual orders.        ECG Results              EKG 12-lead (In process)        Collection Time Result Time QRS Duration OHS QTC Calculation    09/29/24 05:35:00 09/29/24 07:42:07 96 441                     In process by Interface, Lab In [facility] (09/29/24 07:42:10)                   Narrative:    Test Reason : R03.0,    Vent. Rate : 098 BPM     Atrial Rate : 098 BPM     P-R Int : 138 ms          QRS Dur : 096 ms      QT Int : 346 ms       P-R-T Axes : 030 059 012 degrees     QTc Int : 441 ms    Normal sinus rhythm  Normal ECG  No previous ECGs  available    Referred By: [email or web address]   SELF           Confirmed By:                                   Imaging Results              CT Head Without Contrast (Final result)  Result time 09/29/24 06:01:52      Final result by Bean Ge MD (09/29/24 06:01:52)                   Impression:      No CT evidence of acute intracranial abnormality. If the patient has an acute, focal neurological deficit, MRI of the brain may be indicated.      Electronically signed by: Bean Ge MD  Date:    09/29/2024  Time:    06:01               Narrative:    EXAMINATION:  CT HEAD WITHOUT CONTRAST    CLINICAL HISTORY:  Neuro deficit, acute, stroke suspected;    TECHNIQUE:  Low dose axial images were obtained through the head.  Coronal and sagittal reformations were also performed. Contrast was not administered.    COMPARISON:  None.    FINDINGS:  No evidence of acute territorial infarct, hemorrhage, mass effect, or midline shift.    Ventricles are normal in size and configuration.    No displaced calvarial fracture.    Minimal mucosal thickening in the left maxillary sinus.  No air-fluid levels.  Mastoid air cells are essentially clear.                                       Medications   acetaminophen tablet 1,000 mg (1,000 mg Oral Given 9/29/24 0644)   predniSONE tablet 60 mg (60 mg Oral Given 9/29/24 0705)     Medical Decision Making  28 y/o M arrived to the ED via POV with complaint of unable to blink right eye, ringing of right ear with popping, tingling of right side of face and generalized weakness that started suddenly at 4 pm yesterday.Denies headache, dizziness, unilateral weakness or any recent illness.  His wife told him that his speech was slurred this morning, but has resolved.  Last known well 3:50 pm.    Problems Addressed:  Bell's palsy:     Details: Forehead and eye involvement consistent with Bell's palsy. CT head negative. WBC count is normal. Prednisone given here in the ED. Rx for Prednisone and Valtrex  as well as artificial tears, counseled on use and supportive measures. Discussed importance of eye care/protection. Follow up instructions given. Warning s/s discussed and return precautions given; the patient has v/u.    Elevated blood pressure reading:     Details: Discussed with pt, may be r/t presenting symptoms. Counseled on supportive measures and follow up instructions given. Warning s/s discussed and return precautions given; the patient has v/u.      Amount and/or Complexity of Data Reviewed  Labs: ordered.  Radiology: ordered.  ECG/medicine tests: ordered. Decision-making details documented in ED Course.     Details: EKG: NSR HR 98    Risk  OTC drugs.  Prescription drug management.                                      Clinical Impression:   Final diagnoses:  [R03.0] Elevated blood pressure reading  [G51.0] Bell's palsy (Primary)        ED Disposition Condition    Discharge Stable          ED Prescriptions       Medication Sig Dispense Start Date End Date Auth. Provider    predniSONE (DELTASONE) 20 MG tablet Take 3 tablets (60 mg total) by mouth once daily. for 6 days 18 tablet 9/30/2024 10/6/2024 Virginia Baptiste FNP    valACYclovir (VALTREX) 1000 MG tablet Take 1 tablet (1,000 mg total) by mouth 3 (three) times daily. for 7 days 21 tablet 9/29/2024 10/6/2024 Virginia Baptiste FNP    white petrolatum-mineral oiL (ARTIFICIAL TEARS, GILES/MIN,) 83-15 % Oint Place into the right eye every evening. Until symptoms resolve 3.5 g 9/29/2024 -- Virginia Baptiste FNP    polyvinyl alcohol, artificial tears, (ARTIFICIAL TEARS, POLYVIN ALC,) 1.4 % ophthalmic solution Place 1 drop into the right eye as needed. Every 4 hours routine and every 1-2 hrs as needed for dryness/irritation 15 mL 9/29/2024 -- Virginia Baptiste FNP          Follow-up Information       Follow up With Specialties Details Why Contact Info    Hamzah Ferrer II, MD Family Medicine In 1 week  [address] [facility] [place] [address]  [phone]                Virginia Baptiste, [facility]  09/29/24 1115

## 2024-09-29 NOTE — DISCHARGE INSTRUCTIONS
Use prescriptions as directed. Make sure you keep your eye moist. Use eye drops as needed throughout the day and use the ointment at night and tape your eye shut to keep it moist. Tylenol and Ibuprofen as needed for pain. Follow up with your primary care provider within the next week for recheck and continued care and management. Return to the ED for worsening signs and symptoms or otherwise as needed.

## 2024-10-01 ENCOUNTER — OFFICE VISIT (OUTPATIENT)
Dept: ORTHOPEDICS | Facility: CLINIC | Age: 29
End: 2024-10-01
Payer: MEDICAID

## 2024-10-01 DIAGNOSIS — S83.411A COMPLETE TEAR OF MCL OF KNEE, RIGHT, INITIAL ENCOUNTER: Primary | ICD-10-CM

## 2024-10-01 DIAGNOSIS — Z98.890 S/P RIGHT KNEE ARTHROSCOPY: ICD-10-CM

## 2024-10-01 PROCEDURE — 99024 POSTOP FOLLOW-UP VISIT: CPT | Mod: ,,, | Performed by: ORTHOPAEDIC SURGERY

## 2024-10-01 PROCEDURE — 99999 PR PBB SHADOW E&M-EST. PATIENT-LVL II: CPT | Mod: PBBFAC,,, | Performed by: ORTHOPAEDIC SURGERY

## 2024-10-01 PROCEDURE — 99212 OFFICE O/P EST SF 10 MIN: CPT | Mod: PBBFAC | Performed by: ORTHOPAEDIC SURGERY

## 2024-10-01 RX ORDER — METHYLPREDNISOLONE 4 MG/1
TABLET ORAL
Qty: 21 EACH | Refills: 0 | Status: SHIPPED | OUTPATIENT
Start: 2024-10-01 | End: 2024-10-22

## 2024-10-01 NOTE — LETTER
October 1, 2024      [facility]  [address] [place] [address]  Phone: [phone]  Fax: [phone]       Patient: Imer Cagle   YOB: 1995  Date of Visit: 10/01/2024    To Whom It May Concern:    Re Cagle  was at [facility] on 10/01/2024. The patient may remain off work pending results on return appointment in 4 weeks. . If you have any questions or concerns, or if I can be of further assistance, please do not hesitate to contact me.    Sincerely,    Dr Anoop Cardoza

## 2024-10-01 NOTE — PROGRESS NOTES
HISTORY OF PRESENT ILLNESS:       Reconstruction, Knee, Acl, Arthroscopic - Right, Reconstruction, Ligament - Right, and Arthroscopy, Knee - Right 8/14/2024      Pt is here today for Second post-operative followup of his knee arthroscopy.  he is doing well.  We have reviewed his findings and discussed plan of care and future treatment options, including the physical therapy plan.                                                                                     PHYSICAL EXAMINATION:     Incision sites healed well  No evidence of any erythema, infection or induration  Range of motion 0-110 degrees  Minimal effusion  2+ DP pulse  No swelling, no calf tenderness  - Jessica's sign  Negative medial joint line tendernes  Moderate quad atrophy                                                                                 ASSESSMENT:                                                                                                                                               1. Status post above, doing well.                                                                                                                               PLAN:                                                                                                                                                     1. Continue with PT- not ready for RTW  2. Emphasized quad function.  3. I have discussed return to activity in detail.  4. he will see us back in 4 weeks with xrays.  Cont per protocol                                       5. All questions were answered and he should contact us if he  has any questions or concerns in the interim.              There are no Patient Instructions on file for this visit.

## 2024-10-02 ENCOUNTER — CLINICAL SUPPORT (OUTPATIENT)
Dept: REHABILITATION | Facility: HOSPITAL | Age: 29
End: 2024-10-02
Payer: OTHER MISCELLANEOUS

## 2024-10-02 DIAGNOSIS — S83.411A COMPLETE TEAR OF MCL OF KNEE, RIGHT, INITIAL ENCOUNTER: Primary | ICD-10-CM

## 2024-10-02 PROCEDURE — 97110 THERAPEUTIC EXERCISES: CPT

## 2024-10-02 NOTE — PROGRESS NOTES
[facility]   Physical Therapy Treatment Note      Name: Imer Munoz [facility] Number: [id number]    Therapy Diagnosis:   Encounter Diagnosis   Name Primary?    Complete tear of MCL of knee, right, initial encounter Yes     Physician: Grecia Jovel FNP    Visit Date: 10/2/2024  Physician Orders: PT Eval and Treat   Medical Diagnosis from Referral: S83.411A (ICD-10-CM) - Complete tear of MCL of knee, right, initial encounter  Evaluation Date: 9/12/2024  Authorization Period Expiration: See referral notes  Plan of Care Expiration: 10/14/24  Plan of care Certification: 9/12/2024 to 10/14/2024.  Progress Note Due: 10/1/2024  Date of Surgery: 7/13/24  Visit # / Visits authorized: 7/12   FOTO: 54/ 100  MOSHE Jr: 61     Precautions: Standard      Time In: 1319  Time Out: 1405  Total Billable Time: 46 mins       PTA Visit #: 0/5       Subjective     Patient reports: Patient states he has had a little pain, but not much. During the leg press exercise and knee extension stretch, patient experienced a painful popping or catching pain in his knee. It would resolve immediately and only occurred occasionally.  He was compliant with home exercise program.      Pain: 2/10  Location: right knee      Objective      Objective Measures updated at progress report unless specified.     Patient had a small swollen knot on the mid lateral aspect of the knee joint that has now resolved on its own.    Treatment     Imer received the treatments listed below:      therapeutic exercises to develop strength, endurance, ROM, and flexibility for 44 minutes including:    NuStep x 5 min   Calf stretches 4 x 30 secs  HS curls 2x10 with 40#   LAQ (90-30 degrees) with 10# ankle weight x 30  Leg press 1x30 with 60#   Step Ups on 4 in step x 20 (not today)  Hip ADD in parallel bars with red band (band placed above knee joint to avoid incisions) x 20 (not today)  Sitting TKEs into ball in the corner x 30  Heel  slides 2x10  seated edge of mat  SAQs 2x10 with 3 sec hold with 3# (not today)  SLRs VMO 2x10 (not today)  Quad sets 2x10 with BTB   HS stretches 4x20 secs  Hip ABD sidelying 2x10 with 3# (last 10 in a little bit of hip extension) (not today)  Patella mobs x 20 in medial and lateral direction  Patella mobs x 20 superior and inferior direction    Gait Training for 0 mins: (not today)  Patient was educated on the use of one crutch with gait. Crutches were readjusted for patient's height.     Manual Therapy for 2 mins:  Manual knee extension in long sitting 4 x 20s    (Not Today)  neuromuscular re-education activities to improve: Balance, Coordination, Kinesthetic, and Proprioception for        minutes. The following activities were included:  Balance pad standing // bars  SLS // bars  Tandem standing  Ball toss       hot pack for  minutes to .    cold pack for  minutes to .    Patient Education and Home Exercises       Education provided: yes  Written Home Exercises Provided: Yes. Exercises were reviewed and Imer was able to demonstrate them prior to the end of the session.  Imer demonstrated good  understanding of the education provided. See Electronic Medical Record under Patient Instructions for exercises provided during therapy sessions    Assessment     Pt tolerated Tx well to promote increased R knee ROM and strengthening.  PTA provided skilled cues for proper positioning    Imer Is progressing well towards his goals.   Patient prognosis is Excellent.     Patient will continue to benefit from skilled outpatient physical therapy to address the deficits listed in the problem list box on initial evaluation, provide pt/family education and to maximize pt's level of independence in the home and community environment.     Patient's spiritual, cultural and educational needs considered and pt agreeable to plan of care and goals.     Anticipated barriers to physical therapy: none    Goals:   Goals:  Short Term  Goals: 2 weeks   1. Independent with Home Exercise Program   2. Increase Right Knee Range of Motion to 0 Degrees to 120 Degrees  3. Increase Right Knee Strength to grossly 4+/5  4. Patient will ambulate 500 feet with Normal Gait pattern without complaints of pain      Long Term Goals: 4 weeks   1. Patient will increase Right Knee Strength to grossly 5/5  2. Patient will ambulate 1000+ feet with no complaints of pain or weakness in Right Knee     Plan     Pt will continue with POC.    Aubrey Verduzco, PT

## 2024-10-04 ENCOUNTER — CLINICAL SUPPORT (OUTPATIENT)
Dept: REHABILITATION | Facility: HOSPITAL | Age: 29
End: 2024-10-04
Payer: OTHER MISCELLANEOUS

## 2024-10-04 DIAGNOSIS — S83.411A COMPLETE TEAR OF MCL OF KNEE, RIGHT, INITIAL ENCOUNTER: Primary | ICD-10-CM

## 2024-10-04 PROCEDURE — 97110 THERAPEUTIC EXERCISES: CPT

## 2024-10-04 NOTE — PROGRESS NOTES
[facility]   Physical Therapy Treatment Note      Name: Imer Cagle  Clinic Number: [id number]    Therapy Diagnosis:   No diagnosis found.    Physician: Grecia Jovel FNP    Visit Date: 10/4/2024  Physician Orders: PT Eval and Treat   Medical Diagnosis from Referral: S83.411A (ICD-10-CM) - Complete tear of MCL of knee, right, initial encounter  Evaluation Date: 9/12/2024  Authorization Period Expiration: See referral notes  Plan of Care Expiration: 10/14/24  Plan of care Certification: 9/12/2024 to 10/14/2024.  Progress Note Due: 10/1/2024  Date of Surgery: 7/13/24  Visit # / Visits authorized: 8/12   FOTO: 54/ 100  MOSHE Jr: 61     Precautions: Standard      Time In: 1318  Time Out: 1406  Total Billable Time: 48 mins       PTA Visit #: 0/5       Subjective     Patient reports: Patient states he has more pain today than usually.   He was compliant with home exercise program.      Pain: 4/10  Location: right knee      Objective      Objective Measures updated at progress report unless specified.     Patient had a small swollen knot on the mid lateral aspect of the knee joint that has now resolved on its own.    Treatment     Imer received the treatments listed below:      therapeutic exercises to develop strength, endurance, ROM, and flexibility for 44 minutes including:    NuStep x 5 min   Calf stretches 4 x 30 secs  Step ups x 20   HS curls 2x10 with 50#   LAQ (90-30 degrees) with 10# ankle weight x 15  LAQ (90-0 degrees) no weight x 15  Leg press 1x20 with 60#   Ball wall squats x 20  Hip ADD in parallel bars with red band (band placed above knee joint to avoid incisions) x 20 (not today)  Sitting TKEs into ball in the corner x 30 (not today)  Heel slides 2x10  seated edge of mat  SAQs 2x10 with 3 sec hold with 3# (not today)  SLRs VMO 2x10   Quad sets 2x10 with BTB (not today)  HS stretches 4x20 secs  Hip ABD sidelying 2x10 with 3# (last 10 in a little bit of hip  extension) (not today)  Patella mobs x 20 in medial and lateral direction(not today)  Patella mobs x 20 superior and inferior direction (not today)    Gait Training for 0 mins: (not today)  Patient was educated on the use of one crutch with gait. Crutches were readjusted for patient's height.     Manual Therapy for 2 mins:  Manual knee extension in long sitting 4 x 20s    (Not Today)  neuromuscular re-education activities to improve: Balance, Coordination, Kinesthetic, and Proprioception for        minutes. The following activities were included:  Balance pad standing // bars  SLS // bars  Tandem standing  Ball toss       hot pack for  minutes to .    cold pack for  minutes to .    Patient Education and Home Exercises       Education provided: yes  Written Home Exercises Provided: Yes. Exercises were reviewed and Imer was able to demonstrate them prior to the end of the session.  Imer demonstrated good  understanding of the education provided. See Electronic Medical Record under Patient Instructions for exercises provided during therapy sessions    Assessment     Pt tolerated Tx well to promote increased R knee ROM and strengthening.  PTA provided skilled cues for proper positioning    Imer Is progressing well towards his goals.   Patient prognosis is Excellent.     Patient will continue to benefit from skilled outpatient physical therapy to address the deficits listed in the problem list box on initial evaluation, provide pt/family education and to maximize pt's level of independence in the home and community environment.     Patient's spiritual, cultural and educational needs considered and pt agreeable to plan of care and goals.     Anticipated barriers to physical therapy: none    Goals:   Goals:  Short Term Goals: 2 weeks   1. Independent with Home Exercise Program   2. Increase Right Knee Range of Motion to 0 Degrees to 120 Degrees  3. Increase Right Knee Strength to grossly 4+/5  4. Patient will  ambulate 500 feet with Normal Gait pattern without complaints of pain      Long Term Goals: 4 weeks   1. Patient will increase Right Knee Strength to grossly 5/5  2. Patient will ambulate 1000+ feet with no complaints of pain or weakness in Right Knee     Plan     Pt will continue with POC.    Aubrey Verduzco, PT

## 2024-10-07 ENCOUNTER — CLINICAL SUPPORT (OUTPATIENT)
Dept: REHABILITATION | Facility: HOSPITAL | Age: 29
End: 2024-10-07
Payer: OTHER MISCELLANEOUS

## 2024-10-07 DIAGNOSIS — S83.411A COMPLETE TEAR OF MCL OF KNEE, RIGHT, INITIAL ENCOUNTER: Primary | ICD-10-CM

## 2024-10-07 PROCEDURE — 97110 THERAPEUTIC EXERCISES: CPT

## 2024-10-07 NOTE — PROGRESS NOTES
[facility]   Physical Therapy Treatment Note      Name: Imer Cagle  Clinic Number: [id number]    Therapy Diagnosis:   No diagnosis found.    Physician: Grecia Jovel FNP    Visit Date: 10/7/2024  Physician Orders: PT Eval and Treat   Medical Diagnosis from Referral: S83.411A (ICD-10-CM) - Complete tear of MCL of knee, right, initial encounter  Evaluation Date: 9/12/2024  Authorization Period Expiration: See referral notes  Plan of Care Expiration: 10/14/24  Plan of care Certification: 9/12/2024 to 10/14/2024.  Progress Note Due: 10/1/2024  Date of Surgery: 7/13/24  Visit # / Visits authorized: 9/12   FOTO: 54/ 100  MOSHE Jr: 61     Precautions: Standard      Time In: 1323  Time Out: 1415  Total Billable Time: 52 mins       PTA Visit #: 0/5       Subjective     Patient reports: Patient states his knee was popping a lot this weekend, specifically when coming into flexion from full extension during a hamstring stretch. Patient states he was walking on his knee a lot this weekend which may have aggravated it.  He was compliant with home exercise program.      Pain: 3/10  Location: right knee      Objective      Objective Measures updated at progress report unless specified.       Treatment     Imer received the treatments listed below:      therapeutic exercises to develop strength, endurance, ROM, and flexibility for 43 minutes including:    Bike x 5 min   Calf stretches 4 x 30 secs  Step ups x 20   HS curls 2x10 with 50#   LAQ (90-0 degrees) no weight x 15  Leg press 1x20 with 60# (not today)  Ball wall squats x 20  Sidelying R hip ADD x 30  Sitting TKEs into ball in the corner x 30   Heel slides 2x10  seated edge of mat  SAQs 2x10 with 3 sec hold with 3# (not today)  SLRs VMO 2x10 (not today)  Quad sets 2x10 with BTB (not today)  HS stretches with gait belt 4x30 secs  Dio stretch 4 x 30 secs  Hip ABD sidelying 2x10 with 3# (last 10 in a little bit of hip extension) (not  today)  Patella mobs x 20 in medial and lateral direction(not today)  Patella mobs x 20 superior and inferior direction (not today)    Gait Training for 0 mins: (not today)  Patient was educated on the use of one crutch with gait. Crutches were readjusted for patient's height.     (Not Today)  neuromuscular re-education activities to improve: Balance, Coordination, Kinesthetic, and Proprioception for        minutes. The following activities were included:  Balance pad standing // bars  SLS // bars  Tandem standing  Ball toss       hot pack for  minutes to .    cold pack for  minutes to .    Patient Education and Home Exercises       Education provided: yes  Written Home Exercises Provided: Yes. Exercises were reviewed and Imer was able to demonstrate them prior to the end of the session.  Imer demonstrated good  understanding of the education provided. See Electronic Medical Record under Patient Instructions for exercises provided during therapy sessions    Assessment     Pt tolerated Tx well to promote increased R knee ROM and strengthening.  PT provided skilled cues for proper positioning. Patient is continuing to have a popping pain that causes him to wince in his knee when going into flexion from full extension. It is usually in the first 10 degrees of flexion when it occurs. The popping is most apparent after/during a HS stretch or standing TKEs. The popping pain is limiting the patient's ability to achieve full painfree R knee extension.    Imer Is progressing well towards his goals.   Patient prognosis is Excellent.     Patient will continue to benefit from skilled outpatient physical therapy to address the deficits listed in the problem list box on initial evaluation, provide pt/family education and to maximize pt's level of independence in the home and community environment.     Patient's spiritual, cultural and educational needs considered and pt agreeable to plan of care and goals.      Anticipated barriers to physical therapy: none    Goals:   Goals:  Short Term Goals: 2 weeks   1. Independent with Home Exercise Program   2. Increase Right Knee Range of Motion to 0 Degrees to 120 Degrees  3. Increase Right Knee Strength to grossly 4+/5  4. Patient will ambulate 500 feet with Normal Gait pattern without complaints of pain      Long Term Goals: 4 weeks   1. Patient will increase Right Knee Strength to grossly 5/5  2. Patient will ambulate 1000+ feet with no complaints of pain or weakness in Right Knee     Plan     Pt will continue with POC.    Aubrey Verduzco, PT

## 2024-10-16 DIAGNOSIS — S83.411A COMPLETE TEAR OF MCL OF KNEE, RIGHT, INITIAL ENCOUNTER: Primary | ICD-10-CM

## 2024-10-16 DIAGNOSIS — Z98.890 S/P RIGHT KNEE ARTHROSCOPY: ICD-10-CM

## 2024-10-22 ENCOUNTER — CLINICAL SUPPORT (OUTPATIENT)
Dept: REHABILITATION | Facility: HOSPITAL | Age: 29
End: 2024-10-22
Payer: OTHER MISCELLANEOUS

## 2024-10-22 DIAGNOSIS — S83.411A COMPLETE TEAR OF MCL OF KNEE, RIGHT, INITIAL ENCOUNTER: Primary | ICD-10-CM

## 2024-10-22 PROCEDURE — 97110 THERAPEUTIC EXERCISES: CPT

## 2024-10-22 NOTE — PROGRESS NOTES
[facility]   Physical Therapy Treatment Note      Name: Imer Munoz [facility]  Clinic Number: [id number]    Therapy Diagnosis:   Encounter Diagnosis   Name Primary?    Complete tear of MCL of knee, right, initial encounter Yes       Physician: Grecia Jovel FNP    Visit Date: 10/22/2024  Physician Orders: PT Eval and Treat   Medical Diagnosis from Referral: S83.411A (ICD-10-CM) - Complete tear of MCL of knee, right, initial encounter  Evaluation Date: 9/12/2024  Authorization Period Expiration: See referral notes  Plan of Care Expiration: 10/14/24  Plan of care Certification: 9/12/2024 to 10/14/2024.  Progress Note Due: 10/1/2024  Date of Surgery: 7/13/24  Visit # / Visits authorized: 9/12   FOTO: 54/ 100  MOSHE Jr: 61     Precautions: Standard      Time In: 1323  Time Out: 1415  Total Billable Time: 52 mins       PTA Visit #: 0/5       Subjective     Patient reports: Patient continues to complain on pain and popping when going into flexion from full extension.  He was compliant with home exercise program.      Pain: 3/10  Location: right knee      Objective      Objective Measures updated at progress report unless specified.       Treatment     Imer received the treatments listed below:      therapeutic exercises to develop strength, endurance, ROM, and flexibility for 43 minutes including:    Bike x 5 min   Calf stretches 4 x 30 secs  Step ups x 20   HS curls 2x10 with 50#   LAQ (90-0 degrees) no weight x 15  Leg press 1x20 with 60# (not today)  Ball wall squats x 20  Sidelying R hip ADD x 30  Sitting TKEs into ball in the corner x 30   Heel slides 2x10  seated edge of mat  SAQs 2x10 with 3 sec hold with 3# (not today)  SLRs VMO 2x10 (not today)  Quad sets 2x10 with BTB (not today)  HS stretches with gait belt 4x30 secs  Dio stretch 4 x 30 secs  Hip ABD sidelying 2x10 with 3# (last 10 in a little bit of hip extension) (not today)  Patella mobs x 20 in medial and lateral  direction(not today)  Patella mobs x 20 superior and inferior direction (not today)    Gait Training for 0 mins: (not today)  Patient was educated on the use of one crutch with gait. Crutches were readjusted for patient's height.     (Not Today)  neuromuscular re-education activities to improve: Balance, Coordination, Kinesthetic, and Proprioception for        minutes. The following activities were included:  Balance pad standing // bars  SLS // bars  Tandem standing  Ball toss       hot pack for  minutes to .    cold pack for  minutes to .    Patient Education and Home Exercises       Education provided: yes  Written Home Exercises Provided: Yes. Exercises were reviewed and Imer was able to demonstrate them prior to the end of the session.  Imer demonstrated good  understanding of the education provided. See Electronic Medical Record under Patient Instructions for exercises provided during therapy sessions    Assessment     Pt tolerated Tx well to promote increased R knee ROM and strengthening.  PT provided skilled cues for proper positioning. Patient is continuing to have a popping pain that causes him to wince in his knee when going into flexion from full extension. It is usually in the first 10 degrees of flexion when it occurs. .    Imer Is progressing well towards his goals.   Patient prognosis is Excellent.     Patient will continue to benefit from skilled outpatient physical therapy to address the deficits listed in the problem list box on initial evaluation, provide pt/family education and to maximize pt's level of independence in the home and community environment.     Patient's spiritual, cultural and educational needs considered and pt agreeable to plan of care and goals.     Anticipated barriers to physical therapy: none    Goals:   Goals:  Short Term Goals: 2 weeks   1. Independent with Home Exercise Program   2. Increase Right Knee Range of Motion to 0 Degrees to 120 Degrees  3. Increase  Right Knee Strength to grossly 4+/5  4. Patient will ambulate 500 feet with Normal Gait pattern without complaints of pain      Long Term Goals: 4 weeks   1. Patient will increase Right Knee Strength to grossly 5/5  2. Patient will ambulate 1000+ feet with no complaints of pain or weakness in Right Knee     Plan     Pt will continue with POC.    Aubrey Verduzco, PT

## 2024-10-24 ENCOUNTER — CLINICAL SUPPORT (OUTPATIENT)
Dept: REHABILITATION | Facility: HOSPITAL | Age: 29
End: 2024-10-24
Payer: OTHER MISCELLANEOUS

## 2024-10-24 DIAGNOSIS — S83.411A COMPLETE TEAR OF MCL OF KNEE, RIGHT, INITIAL ENCOUNTER: Primary | ICD-10-CM

## 2024-10-24 PROCEDURE — 97110 THERAPEUTIC EXERCISES: CPT | Mod: CQ

## 2024-10-24 PROCEDURE — 97014 ELECTRIC STIMULATION THERAPY: CPT | Mod: CQ

## 2024-10-24 NOTE — PROGRESS NOTES
[facility] OUTPATIENT THERAPY AND WELLNESS   Physical Therapy Treatment Note      Name: Imer Cagle  Clinic Number: [id number]    Therapy Diagnosis:   No diagnosis found.      Physician: Grecia Jovel FNP    Visit Date: 10/24/2024  Physician Orders: PT Eval and Treat   Medical Diagnosis from Referral: S83.411A (ICD-10-CM) - Complete tear of MCL of knee, right, initial encounter  Evaluation Date: 9/12/2024  Authorization Period Expiration: See referral notes  Plan of Care Expiration: 10/14/24  Plan of care Certification: 9/12/2024 to 10/14/2024.  Progress Note Due: 10/1/2024  Date of Surgery: 7/13/24  Visit # / Visits authorized: 10/12   FOTO: 54/ 100  MOSHE Jr: 61     Precautions: Standard      Time In: 1445  Time Out: 1530  Total Billable Time: 45 mins       PTA Visit #: 1/5       Subjective     Patient reports: Patient continues to complain on pain and popping when going into flexion from full extension. States pain is worse today than previous visit. States knee still swells up when he is up on his feet. Pt. Reports decreased pain with IFC and CP today.   He was compliant with home exercise program.      Pain: 4-5/10  Location: right knee      Objective      Objective Measures updated at progress report unless specified.       Treatment     Imer received the treatments listed below:      therapeutic exercises to develop strength, endurance, ROM, and flexibility for 43 minutes including:    Bike x 5 min   Calf stretches 4 x 30 secs  Step ups x 20   HS curls 2x10 with 50# (not today)  LAQ (90-0 degrees) no weight x 15 (not today)  Leg press 1x20 with 60# (not today)  Ball wall squats x 5 with increased pain  Sidelying R hip ADD x 30(not this visit)  Sitting TKEs into ball in the corner x 30 (not this visit)  Heel slides 2x10  seated edge of mat (not this visit)  SAQs 2x10 with 3 sec hold with 3# (not today)  SLRs VMO 2x10 (not today)  Quad sets 2x10 with BTB (not today)  HS stretches with gait belt 4x30  secs (not this visit)  Dio stretch 4 x 30 secs  Hip ABD sidelying 2x10 with 3# (last 10 in a little bit of hip extension) (not today)  Patella mobs x 20 in medial and lateral direction(not today)  Patella mobs x 20 superior and inferior direction (not today)    Gait Training for 5 mins: unlocked knee brace with pt. Reported much improved comfort with gait     (Not Today)  neuromuscular re-education activities to improve: Balance, Coordination, Kinesthetic, and Proprioception for        minutes. The following activities were included:  Balance pad standing // bars  SLS // bars  Tandem standing  Ball toss     After clearing for contraindications, applied IFC at 4000 Hz to Right knee with cold pack x 15 mins.    cold pack for 15 minutes to .    Patient Education and Home Exercises       Education provided: yes  Written Home Exercises Provided: Yes. Exercises were reviewed and Imer was able to demonstrate them prior to the end of the session.  Imer demonstrated good  understanding of the education provided. See Electronic Medical Record under Patient Instructions for exercises provided during therapy sessions    Assessment     Pt tolerated Tx well to promote increased R knee ROM and strengthening.  PT provided skilled cues for proper positioning. Patient is continuing to have a popping pain that causes him to wince in his knee when going into flexion from full extension. It is usually in the first 10 degrees of flexion when it occurs. .    Imer Is progressing well towards his goals.   Patient prognosis is Excellent.     Patient will continue to benefit from skilled outpatient physical therapy to address the deficits listed in the problem list box on initial evaluation, provide pt/family education and to maximize pt's level of independence in the home and community environment.     Patient's spiritual, cultural and educational needs considered and pt agreeable to plan of care and goals.     Anticipated  barriers to physical therapy: none    Goals:   Goals:  Short Term Goals: 2 weeks   1. Independent with Home Exercise Program   2. Increase Right Knee Range of Motion to 0 Degrees to 120 Degrees  3. Increase Right Knee Strength to grossly 4+/5  4. Patient will ambulate 500 feet with Normal Gait pattern without complaints of pain      Long Term Goals: 4 weeks   1. Patient will increase Right Knee Strength to grossly 5/5  2. Patient will ambulate 1000+ feet with no complaints of pain or weakness in Right Knee     Plan     Pt will continue with POC.    Lillian Holman, PTA

## 2024-10-29 ENCOUNTER — OFFICE VISIT (OUTPATIENT)
Dept: ORTHOPEDICS | Facility: CLINIC | Age: 29
End: 2024-10-29
Payer: OTHER MISCELLANEOUS

## 2024-10-29 ENCOUNTER — CLINICAL SUPPORT (OUTPATIENT)
Dept: REHABILITATION | Facility: HOSPITAL | Age: 29
End: 2024-10-29
Payer: OTHER MISCELLANEOUS

## 2024-10-29 DIAGNOSIS — S83.411A COMPLETE TEAR OF MCL OF KNEE, RIGHT, INITIAL ENCOUNTER: Primary | ICD-10-CM

## 2024-10-29 DIAGNOSIS — Z98.890 S/P ACL REPAIR: ICD-10-CM

## 2024-10-29 DIAGNOSIS — Z98.890 S/P RIGHT KNEE ARTHROSCOPY: Primary | ICD-10-CM

## 2024-10-29 PROCEDURE — 99213 OFFICE O/P EST LOW 20 MIN: CPT | Mod: PBBFAC | Performed by: ORTHOPAEDIC SURGERY

## 2024-10-29 PROCEDURE — 99999 PR PBB SHADOW E&M-EST. PATIENT-LVL III: CPT | Mod: PBBFAC,,, | Performed by: ORTHOPAEDIC SURGERY

## 2024-10-29 PROCEDURE — 97110 THERAPEUTIC EXERCISES: CPT | Mod: CQ

## 2024-10-29 PROCEDURE — 97530 THERAPEUTIC ACTIVITIES: CPT | Mod: CQ

## 2024-10-29 PROCEDURE — 97112 NEUROMUSCULAR REEDUCATION: CPT | Mod: CQ

## 2024-10-31 ENCOUNTER — CLINICAL SUPPORT (OUTPATIENT)
Dept: REHABILITATION | Facility: HOSPITAL | Age: 29
End: 2024-10-31
Payer: OTHER MISCELLANEOUS

## 2024-10-31 DIAGNOSIS — S83.411A COMPLETE TEAR OF MCL OF KNEE, RIGHT, INITIAL ENCOUNTER: Primary | ICD-10-CM

## 2024-10-31 PROCEDURE — 97110 THERAPEUTIC EXERCISES: CPT

## 2024-11-05 ENCOUNTER — CLINICAL SUPPORT (OUTPATIENT)
Dept: REHABILITATION | Facility: HOSPITAL | Age: 29
End: 2024-11-05
Payer: OTHER MISCELLANEOUS

## 2024-11-05 DIAGNOSIS — S83.411A COMPLETE TEAR OF MCL OF KNEE, RIGHT, INITIAL ENCOUNTER: Primary | ICD-10-CM

## 2024-11-05 PROCEDURE — 97110 THERAPEUTIC EXERCISES: CPT

## 2024-11-05 NOTE — PROGRESS NOTES
[facility]   Physical Therapy Treatment Note      Name: Imer Cagle  Clinic Number: [id number]    Therapy Diagnosis:   Encounter Diagnosis   Name Primary?    Complete tear of MCL of knee, right, initial encounter Yes         Physician: Grecia Jovel FNP    Visit Date: 11/5/2024  Physician Orders: PT Eval and Treat   Medical Diagnosis from Referral: S83.411A (ICD-10-CM) - Complete tear of MCL of knee, right, initial encounter  Evaluation Date: 9/12/2024  Authorization Period Expiration: See referral notes  Plan of Care Expiration: 11/15/2024  Plan of care Certification: 9/12/2024 to 11/15/2024.  Date of Surgery: 7/13/24  Visit # / Visits authorized:   FOTO: 54/ 100  MOSHE Jr: 61     Precautions: Standard      Time In: 1:15 PM  Time Out: 2:00 PM  Total Billable Time: 45 mins       PTA Visit #: 0/5       Subjective     Patient reports: absence of pain upon arrival today    He was not compliant with home exercise program since last visit.      Pain: 1-2/10 upon arrival today  Location: right knee      Objective      Objective Measures updated at progress report unless specified.       Treatment     Imer received the treatments listed below:      therapeutic exercises and activities to develop strength, endurance, ROM, and flexibility for 23 minutes including: focus on ROM and stretches    Bike x 8.5 min to max knee flexion- seat close no full circles  Calf stretches 4 x 30 secs  Step ups x 30   Heel raises 3 x 10  HS curls 2x10 with 50#   LAQ (90-0 degrees) 10# x 15   Leg press 1x20 with 60# (not today)  Ball wall squats x 20  Sidelying R hip ADD x 30  Sitting TKEs into ball in the corner x 30 (not this visit)  Heel slides 2x10  seated edge of mat (not this visit)  SAQs 2x10 with 3 sec hold with 3# (not today)  SLRs VMO 2x10   Quad sets 2x10 with BTB (not today)  HS stretches with gait belt 4x30 secs   Dio stretch 4 x 30 secs  Hip ABD sidelying 2x10 with 3# (last 10 in a  little bit of hip extension) (not today)  Patella mobs x 20 in medial and lateral direction(not today)  Patella mobs x 20 superior and inferior direction (not today)    Gait Training for 5 mins: unlocked knee brace with pt. Reported much improved comfort with gait (N/A this visit)    Neuromuscular re-education activities to improve: Balance, Coordination, Kinesthetic, and Proprioception for 10 minutes. The following activities were included:  Balance pad standing // bars 2 x 30 seconds  Tandem standing x 15, x 20 and x 30 seconds with right foot in front  Stance on blue disc x 1 min  Ball toss x 20 reps on blue foam pad with good results    After clearing for contraindications, applied IFC at 4000 Hz to Right knee with cold pack x 15 mins. (Not today)      Patient Education and Home Exercises       Education provided: yes  Written Home Exercises Provided: Yes. Exercises were reviewed and Imer was able to demonstrate them prior to the end of the session.  Imer demonstrated good  understanding of the education provided. See Electronic Medical Record under Patient Instructions for exercises provided during therapy sessions    Assessment     Patient is progressing with skilled care. Therapist provided cues for both sequencing and safety.  Imer Is progressing well towards his goals.   Patient prognosis is Excellent.     Patient will continue to benefit from skilled outpatient physical therapy to address the deficits listed in the problem list box on initial evaluation, provide pt/family education and to maximize pt's level of independence in the home and community environment.     Patient's spiritual, cultural and educational needs considered and pt agreeable to plan of care and goals.     Anticipated barriers to physical therapy: none    Goals:  Short Term Goals: 2 weeks   1. Independent with Home Exercise Program   2. Increase Right Knee Range of Motion to 0 Degrees to 120 Degrees  3. Increase Right Knee  Strength to grossly 4+/5  4. Patient will ambulate 500 feet with Normal Gait pattern without complaints of pain      Long Term Goals: 4 weeks   1. Patient will increase Right Knee Strength to grossly 5/5  2. Patient will ambulate 1000+ feet with no complaints of pain or weakness in Right Knee     Plan     Pt will continue with POC.    Aubrey Verduzco, PT

## 2024-11-11 ENCOUNTER — TELEPHONE (OUTPATIENT)
Dept: ORTHOPEDICS | Facility: CLINIC | Age: 29
End: 2024-11-11
Payer: MEDICAID

## 2024-11-11 NOTE — TELEPHONE ENCOUNTER
----- Message from Soha sent at 11/11/2024 10:18 AM CST -----  Regarding: Copy of Appointment Notes  Who Called: Micki Pate (Work [employer])    Caller is requesting assistance/information from provider's office.    Micki is requesting office notes/appointment notes on the patient's previous appointments.    Fax: [phone]

## 2024-11-12 ENCOUNTER — CLINICAL SUPPORT (OUTPATIENT)
Dept: REHABILITATION | Facility: HOSPITAL | Age: 29
End: 2024-11-12
Payer: OTHER MISCELLANEOUS

## 2024-11-12 DIAGNOSIS — S83.411A COMPLETE TEAR OF MCL OF KNEE, RIGHT, INITIAL ENCOUNTER: Primary | ICD-10-CM

## 2024-11-12 PROCEDURE — 97110 THERAPEUTIC EXERCISES: CPT | Mod: CQ

## 2024-11-12 PROCEDURE — 97530 THERAPEUTIC ACTIVITIES: CPT | Mod: CQ

## 2024-11-12 NOTE — PROGRESS NOTES
[facility]   Physical Therapy Treatment Note      Name: Imer Cagle  Clinic Number: [id number]    Therapy Diagnosis:   No diagnosis found.        Physician: Grecia Jovel FNP    Visit Date: 11/12/2024  Physician Orders: PT Eval and Treat   Medical Diagnosis from Referral: S83.411A (ICD-10-CM) - Complete tear of MCL of knee, right, initial encounter  Evaluation Date: 9/12/2024  Authorization Period Expiration: See referral notes  Plan of Care Expiration: 11/15/2024  Plan of care Certification: 9/12/2024 to 11/15/2024.  Date of Surgery: 7/13/24  Visit # / Visits authorized:   FOTO: 54/ 100  MOSHE Jr: 61     Precautions: Standard      Time In: 10:23   Time Out: 11:20  Total Billable Time: 57  mins       PTA Visit #: 1/5       Subjective     Patient reports: absence of pain upon arrival today, states he isn't having to use any ice or pain meds recently. Pt. States he is increasing daily activity and almost back to normal. States bending still hurts.     He was not compliant with home exercise program since last visit.      Pain: 0/10 upon arrival today; 1/10 after tx  Location: right knee      Objective      Objective Measures updated at progress report unless specified.       Treatment     Imer received the treatments listed below:      therapeutic exercises and activities to develop strength, endurance, ROM, and flexibility for 28 minutes including: focus on ROM and stretches    Bike x 8.5 min to max knee flexion- seat close no full circles  Calf stretches 4 x 30 secs  Step ups x 30   Heel raises 3 x 10  HS curls 2x10 with 50#   LAQ (90-0 degrees) 10#  2 x 10    Leg press 1x20 with 60# (not today)  Ball wall squats 3 x 10  Sidelying R hip ADD x 30  SLRs VMO 2x10   Quad sets 2x10 with BTB (not today)  HS stretches with gait belt 3x30 secs   Dio stretch 4 x 30 secs  Hip ABD sidelying 2x10 with 3# (last 10 in a little bit of hip extension) (not today)  Patella mobs x 20 in  medial and lateral direction(not today)  Patella mobs x 20 superior and inferior direction (not today)    Gait Training for 5 mins: unlocked knee brace with pt. Reported much improved comfort with gait (N/A this visit)    Neuromuscular re-education activities to improve: Balance, Coordination, Kinesthetic, and Proprioception for 10 minutes. The following activities were included:  Balance pad standing // bars 2 x 30 seconds with lateral  then anterior / posterior wt. Shifts. .  Tandem standing x 15, x 20 and x 30 seconds with right foot in front  Stance on blue disc x 1.25  min  Ball toss x 20 reps on blue foam pad with good results    After clearing for contraindications, applied IFC at 4000 Hz to Right knee with cold pack x 15 mins. (Not today)      Patient Education and Home Exercises       Education provided: yes  Written Home Exercises Provided: Yes. Exercises were reviewed and Imer was able to demonstrate them prior to the end of the session.  Imer demonstrated good  understanding of the education provided. See Electronic Medical Record under Patient Instructions for exercises provided during therapy sessions    Assessment     Patient is progressing with skilled care. Therapist provided cues for both sequencing and safety.  Imer Is progressing well towards his goals.   Patient prognosis is Excellent.     Patient will continue to benefit from skilled outpatient physical therapy to address the deficits listed in the problem list box on initial evaluation, provide pt/family education and to maximize pt's level of independence in the home and community environment.     Patient's spiritual, cultural and educational needs considered and pt agreeable to plan of care and goals.     Anticipated barriers to physical therapy: none    Goals:  Short Term Goals: 2 weeks   1. Independent with Home Exercise Program   2. Increase Right Knee Range of Motion to 0 Degrees to 120 Degrees  3. Increase Right Knee Strength  to grossly 4+/5  4. Patient will ambulate 500 feet with Normal Gait pattern without complaints of pain      Long Term Goals: 4 weeks   1. Patient will increase Right Knee Strength to grossly 5/5  2. Patient will ambulate 1000+ feet with no complaints of pain or weakness in Right Knee     Plan     Pt will continue with POC.    Lillian Holman, PTA

## 2024-11-14 ENCOUNTER — CLINICAL SUPPORT (OUTPATIENT)
Dept: REHABILITATION | Facility: HOSPITAL | Age: 29
End: 2024-11-14
Payer: OTHER MISCELLANEOUS

## 2024-11-14 DIAGNOSIS — S83.411A COMPLETE TEAR OF MCL OF KNEE, RIGHT, INITIAL ENCOUNTER: Primary | ICD-10-CM

## 2024-11-14 PROCEDURE — 97110 THERAPEUTIC EXERCISES: CPT

## 2024-11-14 NOTE — PROGRESS NOTES
[facility]   Physical Therapy Treatment Note      Name: Imer Munoz [facility]  Clinic Number: [id number]    Therapy Diagnosis:   Encounter Diagnosis   Name Primary?    Complete tear of MCL of knee, right, initial encounter Yes           Physician: Grecia Jovel FNP    Visit Date: 11/14/2024  Physician Orders: PT Eval and Treat   Medical Diagnosis from Referral: S83.411A (ICD-10-CM) - Complete tear of MCL of knee, right, initial encounter  Evaluation Date: 9/12/2024  Authorization Period Expiration: See referral notes  Plan of Care Expiration: 11/15/2024  Plan of care Certification: 9/12/2024 to 11/15/2024.  Date of Surgery: 7/13/24  Visit # / Visits authorized:   FOTO: 54/ 100  MOSHE Jr: 61     Precautions: Standard      Time In: 12:30 PM   Time Out: 1:15 PM  Total Billable Time: 45  mins       PTA Visit #: 0/5       Subjective     Patient reports: absence of pain upon arrival today, states he isn't having to use any ice or pain meds recently. Pt. States he is increasing daily activity and almost back to normal. States bending still hurts.     He was not compliant with home exercise program since last visit.      Pain: 0/10 upon arrival today; 1/10 after tx  Location: right knee      Objective      Objective Measures updated at progress report unless specified.       Treatment     Imer received the treatments listed below:      therapeutic exercises and activities to develop strength, endurance, ROM, and flexibility for 45 minutes including: focus on ROM and stretches    Bike x 8.5 min  Calf stretches 4 x 30 secs  Step ups x 30   Heel raises 3 x 10  HS curls 2x10 with 50#   LAQ (90-0 degrees) 10#  2 x 10    Leg press 1x20 with 60# (not today)  Ball wall squats 3 x 10  Sidelying R hip ADD x 30  SLRs VMO 2x10   Quad sets 2x10 with BTB (not today)  HS stretches with gait belt 3x30 secs   Dio stretch 4 x 30 secs  Hip ABD sidelying 2x10 with 3# (last 10 in a little bit of hip  extension) (not today)  Patella mobs x 20 in medial and lateral direction(not today)  Patella mobs x 20 superior and inferior direction (not today)    Gait Training for 5 mins: unlocked knee brace with pt. Reported much improved comfort with gait (N/A this visit)    Neuromuscular re-education activities to improve: Balance, Coordination, Kinesthetic, and Proprioception for 10 minutes. The following activities were included:  Balance pad standing // bars 2 x 30 seconds with lateral  then anterior / posterior wt. Shifts. .  Tandem standing x 15, x 20 and x 30 seconds with right foot in front  Stance on blue disc x 1.25  min  Ball toss x 20 reps on blue foam pad with good results    After clearing for contraindications, applied IFC at 4000 Hz to Right knee with cold pack x 15 mins. (Not today)      Patient Education and Home Exercises       Education provided: yes  Written Home Exercises Provided: Yes. Exercises were reviewed and Imer was able to demonstrate them prior to the end of the session.  Imer demonstrated good  understanding of the education provided. See Electronic Medical Record under Patient Instructions for exercises provided during therapy sessions    Assessment     Patient is progressing with skilled care. Therapist provided cues for both sequencing and safety.  Imer Is progressing well towards his goals.   Patient prognosis is Excellent.     Patient will continue to benefit from skilled outpatient physical therapy to address the deficits listed in the problem list box on initial evaluation, provide pt/family education and to maximize pt's level of independence in the home and community environment.     Patient's spiritual, cultural and educational needs considered and pt agreeable to plan of care and goals.     Anticipated barriers to physical therapy: none    Goals:  Short Term Goals: 2 weeks   1. Independent with Home Exercise Program   2. Increase Right Knee Range of Motion to 0 Degrees to  120 Degrees  3. Increase Right Knee Strength to grossly 4+/5  4. Patient will ambulate 500 feet with Normal Gait pattern without complaints of pain      Long Term Goals: 4 weeks   1. Patient will increase Right Knee Strength to grossly 5/5  2. Patient will ambulate 1000+ feet with no complaints of pain or weakness in Right Knee     Plan     Pt will continue with POC.    Aubrey Verduzco, PT

## 2024-11-21 ENCOUNTER — CLINICAL SUPPORT (OUTPATIENT)
Dept: REHABILITATION | Facility: HOSPITAL | Age: 29
End: 2024-11-21
Payer: OTHER MISCELLANEOUS

## 2024-11-21 DIAGNOSIS — S83.411A COMPLETE TEAR OF MCL OF KNEE, RIGHT, INITIAL ENCOUNTER: Primary | ICD-10-CM

## 2024-11-21 PROCEDURE — 97110 THERAPEUTIC EXERCISES: CPT

## 2024-11-21 NOTE — PROGRESS NOTES
[facility]   Physical Therapy Treatment Note      Name: Imer Munoz [facility]  Clinic Number: [id number]    Therapy Diagnosis:   Encounter Diagnosis   Name Primary?    Complete tear of MCL of knee, right, initial encounter Yes           Physician: Grecia Jovel FNP    Visit Date: 11/21/2024  Physician Orders: PT Eval and Treat   Medical Diagnosis from Referral: S83.411A (ICD-10-CM) - Complete tear of MCL of knee, right, initial encounter  Evaluation Date: 9/12/2024  Authorization Period Expiration: See referral notes  Plan of Care Expiration: 11/15/2024  Plan of care Certification: 9/12/2024 to 11/15/2024.  Date of Surgery: 7/13/24  Visit # / Visits authorized:   FOTO: 54/ 100  MOSHE Jr: 61     Precautions: Standard      Time In: 12:30 PM   Time Out: 1:15 PM  Total Billable Time: 45  mins       PTA Visit #: 0/5       Subjective     Patient reports: absence of pain upon arrival today, states he isn't having to use any ice or pain meds recently. Pt. States he is increasing daily activity and almost back to normal. States bending still hurts.     He was not compliant with home exercise program since last visit.      Pain: 0/10 upon arrival today; 1/10 after tx  Location: right knee      Objective      Objective Measures updated at progress report unless specified.       Treatment     Imer received the treatments listed below:      therapeutic exercises and activities to develop strength, endurance, ROM, and flexibility for 45 minutes including: focus on ROM and stretches    Bike x 8.5 min  Calf stretches 4 x 30 secs  Step ups x 30   Heel raises 3 x 10  HS curls 2x10 with 50#   LAQ (90-0 degrees) 10#  2 x 10    Leg press 1x20 with 60# (not today)  Ball wall squats 3 x 10  Sidelying R hip ADD x 30  SLRs VMO 2x10   Quad sets 2x10 with BTB (not today)  HS stretches with gait belt 3x30 secs   Dio stretch 4 x 30 secs  Hip ABD sidelying 2x10 with 3# (last 10 in a little bit of hip  extension) (not today)  Patella mobs x 20 in medial and lateral direction(not today)  Patella mobs x 20 superior and inferior direction (not today)    Gait Training for 5 mins: unlocked knee brace with pt. Reported much improved comfort with gait (N/A this visit)    Neuromuscular re-education activities to improve: Balance, Coordination, Kinesthetic, and Proprioception for 10 minutes. The following activities were included:  Balance pad standing // bars 2 x 30 seconds with lateral  then anterior / posterior wt. Shifts. .  Tandem standing x 15, x 20 and x 30 seconds with right foot in front  Stance on blue disc x 1.25  min  Ball toss x 20 reps on blue foam pad with good results    After clearing for contraindications, applied IFC at 4000 Hz to Right knee with cold pack x 15 mins. (Not today)      Patient Education and Home Exercises       Education provided: yes  Written Home Exercises Provided: Yes. Exercises were reviewed and Imer was able to demonstrate them prior to the end of the session.  Imer demonstrated good  understanding of the education provided. See Electronic Medical Record under Patient Instructions for exercises provided during therapy sessions    Assessment     Patient is progressing with skilled care. Therapist provided cues for both sequencing and safety.  Imer Is progressing well towards his goals.   Patient prognosis is Excellent.     Patient will continue to benefit from skilled outpatient physical therapy to address the deficits listed in the problem list box on initial evaluation, provide pt/family education and to maximize pt's level of independence in the home and community environment.     Patient's spiritual, cultural and educational needs considered and pt agreeable to plan of care and goals.     Anticipated barriers to physical therapy: none    Goals:  Short Term Goals: 2 weeks   1. Independent with Home Exercise Program   2. Increase Right Knee Range of Motion to 0 Degrees to  120 Degrees  3. Increase Right Knee Strength to grossly 4+/5  4. Patient will ambulate 500 feet with Normal Gait pattern without complaints of pain      Long Term Goals: 4 weeks   1. Patient will increase Right Knee Strength to grossly 5/5  2. Patient will ambulate 1000+ feet with no complaints of pain or weakness in Right Knee     Plan     Pt will continue with POC.    Aubrey Verduzco, PT

## 2024-12-10 ENCOUNTER — OFFICE VISIT (OUTPATIENT)
Dept: ORTHOPEDICS | Facility: CLINIC | Age: 29
End: 2024-12-10
Payer: OTHER MISCELLANEOUS

## 2024-12-10 ENCOUNTER — HOSPITAL ENCOUNTER (OUTPATIENT)
Dept: RADIOLOGY | Facility: HOSPITAL | Age: 29
Discharge: HOME | End: 2024-12-10
Attending: ORTHOPAEDIC SURGERY
Payer: OTHER MISCELLANEOUS

## 2024-12-10 DIAGNOSIS — Z98.890 S/P RIGHT KNEE ARTHROSCOPY: Primary | ICD-10-CM

## 2024-12-10 DIAGNOSIS — Z98.890 S/P ACL REPAIR: ICD-10-CM

## 2024-12-10 DIAGNOSIS — Z98.890 S/P RIGHT KNEE ARTHROSCOPY: ICD-10-CM

## 2024-12-10 PROCEDURE — 99212 OFFICE O/P EST SF 10 MIN: CPT | Mod: PBBFAC,25 | Performed by: ORTHOPAEDIC SURGERY

## 2024-12-10 PROCEDURE — 73564 X-RAY EXAM KNEE 4 OR MORE: CPT | Mod: TC,RT

## 2024-12-10 PROCEDURE — 99999 PR PBB SHADOW E&M-EST. PATIENT-LVL II: CPT | Mod: PBBFAC,,, | Performed by: ORTHOPAEDIC SURGERY

## 2024-12-10 NOTE — PROGRESS NOTES
HISTORY OF PRESENT ILLNESS:       Reconstruction, Knee, Acl, Arthroscopic - Right, Reconstruction, Ligament - Right, and Arthroscopy, Knee - Right 8/14/2024      Pt is here today for Third post-operative followup of his knee arthroscopy.  he is doing well.  We have reviewed his findings and discussed plan of care and future treatment options.   Patient completed formal physical therapy two weeks ago with good report  Patient is not back to work:  and    He feels he is slowly returning to activity   Ibuprofen/Tylenol every day for pain relief.   Cannot kneel secondary to pain along the medial knee                                                                                     PHYSICAL EXAMINATION:     Incision sites healed well  No evidence of any erythema, infection or induration  Range of motion 0-110 degrees  Minimal effusion  2+ DP pulse  No swelling, no calf tenderness  - Jessica's sign  Negative medial joint line tendernes  Moderate quad atrophy  Stable to varus and valgus stress negative Lachman's negative posterior drawer                   X-Ray Knee Complete 4 Or More Views Right    Result Date: 12/10/2024  See Procedure Notes for results. IMPRESSION: Please see Ortho procedure notes for report.  This procedure was auto-finalized by: Virtual Radiologist   Four views right knee were obtained today demonstrating well-positioned cortical fixation buttons corresponding to ACL and MCL reconstruction                                                              ASSESSMENT:                                                                                                                                               1. Status post above, doing well.                                                                                                                               PLAN:                                                                                                                                                      1. Continue with PT-needs another 6-8 weeks of physical therapy.  He describes his work duties to me and I do not think he is ready to get back to work.  He is getting over a multi lig knee reconstruction he is still a bit tight range of motion from 0-100 and 10° today I do think it is flexion is improving his strength and I there is still ambulating with a bit of a limp today.  I think he needs another 6-8 weeks before he is really ready to get back to his work duties which requires a lot of heavy lifting deep squatting and bending and pivoting.  As for now he would be suitable for light duty including  certainly suitable for desk duty but not suitable for lifting anything heavier than 20-30 lb at this point and no heavy bending twisting lifting or squatting and climbing  2. Emphasized quad function.  3. I have discussed return to activity in detail.  4. he will see us back in 8 weeks.                                      5. All questions were answered and he should contact us if he  has any questions or concerns in the interim.              There are no Patient Instructions on file for this visit.

## 2024-12-10 NOTE — LETTER
December 10, 2024      [facility]  [address] [place] [id number]  Phone: [phone]  Fax: [phone]       Patient: Imer Cagle   YOB: 1995  Date of Visit: 12/10/2024    To Whom It May Concern:    Re Cagle  was at [facility] on 12/10/2024. The patient will remain off work for 2 months. He will follow-up in clinic at that time and work status will be re-evaluated. If you have any questions or concerns, or if I can be of further assistance, please do not hesitate to contact me.    Sincerely,    Anoop Arnold MD

## 2024-12-12 ENCOUNTER — CLINICAL SUPPORT (OUTPATIENT)
Dept: REHABILITATION | Facility: HOSPITAL | Age: 29
End: 2024-12-12
Payer: OTHER MISCELLANEOUS

## 2024-12-12 DIAGNOSIS — S83.411A COMPLETE TEAR OF MCL OF KNEE, RIGHT, INITIAL ENCOUNTER: Primary | ICD-10-CM

## 2024-12-12 PROCEDURE — 97110 THERAPEUTIC EXERCISES: CPT

## 2024-12-12 NOTE — PROGRESS NOTES
[facility]   Physical Therapy Treatment Note      Name: Imer Cagle  Clinic Number: [id number]    Therapy Diagnosis:   No diagnosis found.          Physician: Grecia Jovel FNP    Visit Date: 12/12/2024  Physician Orders: PT Eval and Treat   Medical Diagnosis from Referral: S83.411A (ICD-10-CM) - Complete tear of MCL of knee, right, initial encounter  Evaluation Date: 9/12/2024  Authorization Period Expiration: See referral notes  Plan of Care Expiration: 11/15/2024  Plan of care Certification: 9/12/2024 to 11/15/2024.  Date of Surgery: 7/13/24  Visit # / Visits authorized:   FOTO: 54/ 100  MOSHE Jr: 61     Precautions: Standard      Time In: 11:05 PM   Time Out: 11:50 PM  Total Billable Time: 45  mins       PTA Visit #: 0/5       Subjective     Patient reports: States bending still hurts.     He was not compliant with home exercise program since last visit.      Pain: 0/10 upon arrival today; 1/10 after tx  Location: right knee      Objective      Objective Measures updated at progress report unless specified.       Treatment     Imer received the treatments listed below:      therapeutic exercises and activities to develop strength, endurance, ROM, and flexibility for 45 minutes including: focus on ROM and stretches    Bike x 8.5 min  Calf stretches 4 x 30 secs  Step ups x 30   Heel raises 3 x 10  HS curls 2x10 with 50#   LAQ (90-0 degrees) 10#  2 x 10    Leg press 1x20 with 60# (not today)  Ball wall squats 3 x 10  Sidelying R hip ADD x 30  SLRs VMO 2x10   Quad sets 2x10 with BTB (not today)  HS stretches with gait belt 3x30 secs   Dio stretch 4 x 30 secs  Hip ABD sidelying 2x10 with 3# (last 10 in a little bit of hip extension) (not today)  Patella mobs x 20 in medial and lateral direction(not today)  Patella mobs x 20 superior and inferior direction (not today)    Gait Training for 5 mins: unlocked knee brace with pt. Reported much improved comfort with gait (N/A  this visit)    Neuromuscular re-education activities to improve: Balance, Coordination, Kinesthetic, and Proprioception for 10 minutes. The following activities were included:  Balance pad standing // bars 2 x 30 seconds with lateral  then anterior / posterior wt. Shifts. .  Tandem standing x 15, x 20 and x 30 seconds with right foot in front  Stance on blue disc x 1.25  min  Ball toss x 20 reps on blue foam pad with good results    After clearing for contraindications, applied IFC at 4000 Hz to Right knee with cold pack x 15 mins. (Not today)      Patient Education and Home Exercises       Education provided: yes  Written Home Exercises Provided: Yes. Exercises were reviewed and Imer was able to demonstrate them prior to the end of the session.  Imer demonstrated good  understanding of the education provided. See Electronic Medical Record under Patient Instructions for exercises provided during therapy sessions    Assessment     Patient is progressing with skilled care. Therapist provided cues for both sequencing and safety.  Imer Is progressing well towards his goals.   Patient prognosis is Excellent.     Patient will continue to benefit from skilled outpatient physical therapy to address the deficits listed in the problem list box on initial evaluation, provide pt/family education and to maximize pt's level of independence in the home and community environment.     Patient's spiritual, cultural and educational needs considered and pt agreeable to plan of care and goals.     Anticipated barriers to physical therapy: none    Goals:  Short Term Goals: 2 weeks   1. Independent with Home Exercise Program   2. Increase Right Knee Range of Motion to 0 Degrees to 120 Degrees  3. Increase Right Knee Strength to grossly 4+/5  4. Patient will ambulate 500 feet with Normal Gait pattern without complaints of pain      Long Term Goals: 4 weeks   1. Patient will increase Right Knee Strength to grossly 5/5  2. Patient  will ambulate 1000+ feet with no complaints of pain or weakness in Right Knee     Plan     Pt will continue with POC.    Aubrey Verduzco, PT

## (undated) DEVICE — Device

## (undated) DEVICE — BLADE INCISOR 4.5MM

## (undated) DEVICE — KIT ACP + ACD-A

## (undated) DEVICE — KIT INSTRUMENT DRILL 2.4MM

## (undated) DEVICE — DRAPE THREE-QTR REINF 53X77IN

## (undated) DEVICE — SYR IRRIGATION BULB STER 60ML

## (undated) DEVICE — SUT ETHILON 3-0 PS2 18 BLK

## (undated) DEVICE — PIN DRILL ACL T ROPE 4MM

## (undated) DEVICE — GLOVE SENSICARE PI GRN 8

## (undated) DEVICE — POUCH INSTRUMENT 2 POCKET

## (undated) DEVICE — NDL QUINCKE SPINAL 18G 3.5IN

## (undated) DEVICE — GLOVE SENSICARE PI SURG 7

## (undated) DEVICE — SUT VICRYL 2-0 36 CT-1

## (undated) DEVICE — BANDAGE ACE DOUBLE STER 6IN

## (undated) DEVICE — GLOVE SENSICARE PI GRN 6.5

## (undated) DEVICE — BLADE BONECUTTER PLAT 5.5MM

## (undated) DEVICE — PAD ABDOMINAL 8X7.5 STERILE

## (undated) DEVICE — BANDAGE MATRIX HK LOOP 6IN 5YD

## (undated) DEVICE — SAWBLADE TRANS TIB ACL

## (undated) DEVICE — SUT BLU BR 2 TAPERD NDL 1/2

## (undated) DEVICE — KIT SERIES 1 ACP

## (undated) DEVICE — SUT MONOCRYL 3-0 PS-2 UND

## (undated) DEVICE — PENCIL ELECTROSURG HOLST W/BLD

## (undated) DEVICE — PACK KNEE ARTHROSCOPY  RUSH

## (undated) DEVICE — SUT SUTURETAPE TIGERLINK 1.3MM

## (undated) DEVICE — PACK ECLIPSE BASIC III SURG

## (undated) DEVICE — CANISTER SUCTION JUMBO 12L

## (undated) DEVICE — DRESSING GAUZE XEROFORM 5X9

## (undated) DEVICE — GLOVE SENSICARE PI GRN 7.5

## (undated) DEVICE — SOL NACL IRR 1000ML BTL

## (undated) DEVICE — BAG RECTANGLE RBBRBND 30X36IN

## (undated) DEVICE — BIT DRILL ACL TIGHTROPE 4MM

## (undated) DEVICE — HANDLE MEDI-VAC YANKAUER STR

## (undated) DEVICE — TUBING CROSSFLOW INFLOW CASS

## (undated) DEVICE — NDL SURGEON MAYO #7 2/PK 72PKS

## (undated) DEVICE — DRAPE ARTHSCP T ORTHOMAX POUCH

## (undated) DEVICE — BLADE INCISOR + STR VIOL 4.5MM

## (undated) DEVICE — WAND COBLATION WEREWOLF FLOW 50

## (undated) DEVICE — TUBE SUCTION MEDI-VAC STERILE

## (undated) DEVICE — SPONGE COTTON TRAY 4X4IN

## (undated) DEVICE — DRAPE C-ARMOR EQUIPMENT COVER

## (undated) DEVICE — SHAVER SYNNOVATOR PLAT SERIES 4.5MM

## (undated) DEVICE — GOWN POLY REINF BRTH SLV XL

## (undated) DEVICE — BLADE SURG #15 CARBON STEEL

## (undated) DEVICE — MARKER SKIN RULER AND LABEL

## (undated) DEVICE — TOURNIQUET SB QC SP 34X4IN

## (undated) DEVICE — SUT 4-0 ETHILON 18 PS-2

## (undated) DEVICE — ADHESIVE MASTISOL VIAL 48/BX

## (undated) DEVICE — GLOVE SENSICARE PI SURG 6.5

## (undated) DEVICE — DRAPE INVISISHIELD U 48X52IN

## (undated) DEVICE — SPONGE LAP MASTER 12X12IN

## (undated) DEVICE — DEVICE SUCTION H20 BROOM 12FT

## (undated) DEVICE — COVER PROXIMA MAYO STAND

## (undated) DEVICE — APPLICATOR CHLORAPREP ORN 26ML

## (undated) DEVICE — SOL NACL IRR 3000ML

## (undated) DEVICE — GLOVE SENSICARE PI GRN 7